# Patient Record
Sex: FEMALE | Race: BLACK OR AFRICAN AMERICAN | NOT HISPANIC OR LATINO | Employment: UNEMPLOYED | URBAN - METROPOLITAN AREA
[De-identification: names, ages, dates, MRNs, and addresses within clinical notes are randomized per-mention and may not be internally consistent; named-entity substitution may affect disease eponyms.]

---

## 2017-07-24 ENCOUNTER — HOSPITAL ENCOUNTER (EMERGENCY)
Facility: HOSPITAL | Age: 57
Discharge: HOME | End: 2017-07-24
Admitting: PHYSICIAN ASSISTANT

## 2017-07-24 VITALS
SYSTOLIC BLOOD PRESSURE: 140 MMHG | RESPIRATION RATE: 16 BRPM | HEIGHT: 66 IN | OXYGEN SATURATION: 98 % | HEART RATE: 60 BPM | DIASTOLIC BLOOD PRESSURE: 82 MMHG | WEIGHT: 225 LBS | TEMPERATURE: 98.3 F | BODY MASS INDEX: 36.16 KG/M2

## 2017-07-24 DIAGNOSIS — E03.9 HYPOTHYROIDISM, UNSPECIFIED TYPE: ICD-10-CM

## 2017-07-24 DIAGNOSIS — J32.9 SINUSITIS, UNSPECIFIED CHRONICITY, UNSPECIFIED LOCATION: Primary | ICD-10-CM

## 2017-07-24 DIAGNOSIS — R51.9 SINUS HEADACHE: ICD-10-CM

## 2017-07-24 PROCEDURE — 99282 EMERGENCY DEPT VISIT SF MDM: CPT | Performed by: PHYSICIAN ASSISTANT

## 2017-07-24 PROCEDURE — 99283 EMERGENCY DEPT VISIT LOW MDM: CPT

## 2017-07-24 PROCEDURE — 63710000001 PREDNISONE PER 1 MG: Performed by: PHYSICIAN ASSISTANT

## 2017-07-24 RX ORDER — METHYLPREDNISOLONE 4 MG/1
TABLET ORAL
Qty: 21 TABLET | Refills: 0 | Status: SHIPPED | OUTPATIENT
Start: 2017-07-24 | End: 2017-09-14

## 2017-07-24 RX ORDER — PREDNISONE 20 MG/1
40 TABLET ORAL ONCE
Status: COMPLETED | OUTPATIENT
Start: 2017-07-24 | End: 2017-07-24

## 2017-07-24 RX ORDER — LEVOTHYROXINE SODIUM 0.1 MG/1
100 TABLET ORAL DAILY
Qty: 30 TABLET | Refills: 0 | Status: SHIPPED | OUTPATIENT
Start: 2017-07-24 | End: 2017-08-23

## 2017-07-24 RX ADMIN — PREDNISONE 40 MG: 20 TABLET ORAL at 20:14

## 2017-07-24 NOTE — ED PROVIDER NOTES
Subjective   History of Present Illness  History of Present Illness    Chief complaint: Sinus pressure and refill of Synthroid    Location: Bilateral frontal sinuses    Quality/Severity:  Pressure, moderate    Timing/Duration: 8 weeks, chronic, varies in intensity    Modifying Factors: Nothing makes worse or better.    Associated Symptoms: Headache. Denies fevers or chills.  Denies neck pain.  Denies chest pain or shortness of breath.  Denies cough.    Narrative: 56-year-old female presents for sinus pressure that has been ongoing.  She states that she needs antibiotics.  She has not had any fevers.  She has a history of allergies and allergic rhinitis but is allergic to antihistamines.  She is unable to tolerate nasal corticosteroids but is able to tolerate oral steroids.  She also no longer has a primary care provider and needs a refill of her Synthroid.  She states that she needs the brand only.  Her last TSH was approximately 4 months ago.  She has been on Synthroid for many years and has not had any dosage changes.  Patient states that she did have a CT of her brain in the past year which was negative.  Patient also states that she does not have insurance and would like samples of Synthroid.    Review of Systems  General: Denies fevers or chills.  Denies any weakness or fatigue.  Denies any weight loss or weight gain.  SKIN: Denies any rashes lesions or ulcers.  Denies color change.  ENT: Denies sore throat. Denies ear pain.    EYES: Denies any blurred vision.  Denies any change in vision.  Denies any photophobia.  Denies any vision loss.  LUNGS: Denies any shortness of breath or wheezing.  Denies any cough.  Denies any hemoptysis.  CARDIAC: Denies any chest pain.  Denies palpitations.  Denies syncope.  Denies any edema  ABD: Denies any abdominal pain.  Denies any nausea or vomiting or diarrhea.  Denies any rectal bleeding.  Denies constipation  : Denies any dysuria, urgency, frequency or hematuria.  Denies  discharge.  Denies flank pain.  NEURO: Denies any focal weakness.  + headache.  Denies seizures.  Denies changes in speech or difficulty walking.  ENDOCRINE: Denies polydipsia and polyuria  M/S: Denies arthralgias, back pain, myalgias or neck pain  HEME/LYMPH: Negative for adenopathy. Does not bruise/bleed easily.   PSYCH: Negative for suicidal ideas. Denies anxiety or depression  A 10 system review was performed in addition to those in the above all other reviews are negative.      Past Medical History:   Diagnosis Date   • Colon polyp    • Disease of thyroid gland    • Hyperlipidemia        Allergies   Allergen Reactions   • Antihistamines, Loratadine-Type Hives   • Claritin [Loratadine] Hives       Past Surgical History:   Procedure Laterality Date   • ABDOMINAL SURGERY     • APPENDECTOMY     • COLON SURGERY     • COLONOSCOPY N/A 11/28/2016    Procedure: COLONOSCOPY with polypectomy;  Surgeon: Kristy Caballero MD;  Location: [facility];  Service:    • DENTAL PROCEDURE         Family History   Problem Relation Age of Onset   • Stroke Mother    • Prostate cancer Father    • Colon polyps Sister    • Hypertension Brother    • Prostate cancer Brother        Social History     Social History   • Marital status:      Spouse name: N/A   • Number of children: N/A   • Years of education: N/A     Social History Main Topics   • Smoking status: Never Smoker   • Smokeless tobacco: None   • Alcohol use Yes      Comment: rarely   • Drug use: No   • Sexual activity: Defer     Other Topics Concern   • None     Social History Narrative   • None     No current facility-administered medications on file prior to encounter.      Current Outpatient Prescriptions on File Prior to Encounter   Medication Sig Dispense Refill   • [DISCONTINUED] levothyroxine (SYNTHROID, LEVOTHROID) 100 MCG tablet Take 100 mcg by mouth daily.               Objective   Physical Exam  Vitals:    07/24/17 1855   BP: 158/97   Pulse: 68   Resp: 16   Temp: 98.3  °F (36.8 °C)   SpO2: 97%     GENERAL: a/o x 4, NAD  SKIN: Warm pink and dry   HEENT:  PERRLA, EOM intact, conjunctiva normal, sclera clear, TMs clear bilaterally.  Oropharynx clear.  There is no erythema or exudate.  Bilateral frontal sinus tenderness to palpation and percussion.   NECK: supple, no JVD.  Negative Kernig's and Brudzinski's  LUNGS: Clear to auscultation bilaterally without wheezes, rales or rhonchi.  No accessory muscle use and no nasal flaring.  CARDIAC:  Regular rate and rhythm, S1-S2.  No murmurs, rubs or gallops.  No peripheral edema.  Equal pulses bilaterally.  ABDOMEN: Soft, nontender, nondistended.  No guarding or rebound tenderness.  Normal bowel sounds.  MUSCULOSKELETAL: Moves all extremities well.  No deformity.  NEURO: Cranial nerves II through XII grossly intact.  No gross focal deficits.  Alert.  Normal speech and motor.  PSYCH: Normal mood and affect      Procedures         ED Course  ED Course    Patient states that she can take brand only Synthroid. (would like samples, but I explained to her that we do not have samples in the emergency Department)  We discussed a CT of the brain for further evaluation however patient refuses secondary to not having insurance and states that she did have one within the past year.  Pt has been counseled on elevated BP and instructed to f/u with PMD for repeat BP check.  Prednisone 40mg po x 1 in ER. D/c with medrol dose pack and saline nasal spray.               MDM  Number of Diagnoses or Management Options  Hypothyroidism, unspecified type: established and worsening  Sinus headache: established and worsening  Sinusitis, unspecified chronicity, unspecified location: established and worsening  Risk of Complications, Morbidity, and/or Mortality  Presenting problems: low  Diagnostic procedures: low  Management options: low    Patient Progress  Patient progress: improved    Patient is afebrile, symptoms have been going on for 8 weeks.  I doubt bacterial  infection at this time.  We'll treat for sinusitis with oral steroids as patient cannot tolerate intranasal medications.  I suggested that she use saline nasal spray as well.  My differential diagnosis for headache includes but is not limited to:  Migraine, cluster, ischemic stroke, subarachnoid hemorrhage, intracranial hemorrhage, vascular malformation, cerebral aneurysm, vascular dissection, vasculitis, temporal arteritis, malignant hypertension, pheochromocytoma, cerebral venous thrombosis, preeclampsia; bacterial meningitis, viral meningitis, fungal meningitis, encephalitis, brain abscess, pleural empyema, sinusitis, dental infection, influenza, viral syndrome; carbon monoxide exposure, analgesic abuse, hypoglycemia; trigeminal neuralgia, postherpetic neuralgia, occipital neuralgia; subdural hematoma, concussion, musculoskeletal tension, cervical osteoarthritis; glaucoma, TMJ disease, pseudotumor cerebri, post LP headache, intracranial neoplasm, sleep apnea    Final diagnoses:   Sinusitis, unspecified chronicity, unspecified location   Sinus headache   Hypothyroidism, unspecified type            Olga Thomas PA-C  07/24/17 2015

## 2017-09-14 ENCOUNTER — HOSPITAL ENCOUNTER (EMERGENCY)
Facility: HOSPITAL | Age: 57
Discharge: HOME | End: 2017-09-14
Admitting: PHYSICIAN ASSISTANT

## 2017-09-14 VITALS
DIASTOLIC BLOOD PRESSURE: 86 MMHG | OXYGEN SATURATION: 98 % | TEMPERATURE: 98.1 F | BODY MASS INDEX: 36.88 KG/M2 | HEART RATE: 68 BPM | HEIGHT: 67 IN | RESPIRATION RATE: 18 BRPM | SYSTOLIC BLOOD PRESSURE: 130 MMHG | WEIGHT: 235 LBS

## 2017-09-14 DIAGNOSIS — J32.9 SINUSITIS, UNSPECIFIED CHRONICITY, UNSPECIFIED LOCATION: ICD-10-CM

## 2017-09-14 DIAGNOSIS — Z76.0 MEDICATION REFILL: ICD-10-CM

## 2017-09-14 DIAGNOSIS — E03.9 HYPOTHYROIDISM, UNSPECIFIED TYPE: ICD-10-CM

## 2017-09-14 DIAGNOSIS — R51.9 SINUS HEADACHE: Primary | ICD-10-CM

## 2017-09-14 PROCEDURE — 99282 EMERGENCY DEPT VISIT SF MDM: CPT | Performed by: PHYSICIAN ASSISTANT

## 2017-09-14 PROCEDURE — 63710000001 PREDNISONE PER 1 MG: Performed by: PHYSICIAN ASSISTANT

## 2017-09-14 PROCEDURE — 99283 EMERGENCY DEPT VISIT LOW MDM: CPT

## 2017-09-14 RX ORDER — METHYLPREDNISOLONE 4 MG/1
TABLET ORAL
Qty: 21 TABLET | Refills: 0 | Status: SHIPPED | OUTPATIENT
Start: 2017-09-14 | End: 2018-10-11

## 2017-09-14 RX ORDER — LEVOTHYROXINE SODIUM 0.1 MG/1
100 TABLET ORAL DAILY
COMMUNITY
End: 2017-09-14

## 2017-09-14 RX ORDER — PREDNISONE 20 MG/1
60 TABLET ORAL ONCE
Status: COMPLETED | OUTPATIENT
Start: 2017-09-14 | End: 2017-09-14

## 2017-09-14 RX ORDER — ACETAMINOPHEN 325 MG/1
650 TABLET ORAL ONCE
Status: COMPLETED | OUTPATIENT
Start: 2017-09-14 | End: 2017-09-14

## 2017-09-14 RX ORDER — LEVOTHYROXINE SODIUM 0.1 MG/1
100 TABLET ORAL DAILY
Qty: 20 TABLET | Refills: 0 | Status: SHIPPED | OUTPATIENT
Start: 2017-09-14 | End: 2020-09-23 | Stop reason: ALTCHOICE

## 2017-09-14 RX ORDER — AZITHROMYCIN 250 MG/1
TABLET, FILM COATED ORAL
Qty: 6 TABLET | Refills: 0 | Status: SHIPPED | OUTPATIENT
Start: 2017-09-14 | End: 2018-10-11

## 2017-09-14 RX ADMIN — ACETAMINOPHEN 650 MG: 325 TABLET, FILM COATED ORAL at 19:16

## 2017-09-14 RX ADMIN — PREDNISONE 60 MG: 20 TABLET ORAL at 19:16

## 2017-09-14 NOTE — DISCHARGE INSTRUCTIONS
Return to the emergency department with worsening symptoms, uncontrolled pain, inability to tolerate oral liquids, fever greater than 101°F not controlled by Tylenol or as needed with emergent concerns.    Use saline nasal sprays and Mucinex to help with congestion.  Nikky Pots will also help with sinus congestion

## 2017-09-14 NOTE — ED PROVIDER NOTES
Subjective   History of Present Illness  History of Present Illness    Chief complaint: Sinusitis and medication refill    Location: Bilateral sinuses    Quality/Severity:  Congestion, mild    Timing/Duration: One month    Modifying Factors: Nothing makes worse or better.    Associated Symptoms: Positive sinus headache (no change in character from prior).  Patient has not taken anything today for.  Denies fevers or chills.  Denies chest pain or shortness of breath.  Denies cough.    Narrative: 56-year-old female presents with sinusitis for one month.  She has had yellow drainage.  She does have a sinus headache consistent with prior sinus infections.  She has not taken anything for the headache today.  She also would like a refill of her Synthroid.  She's been out of it for 3 weeks.  She does not have a primary care provider.  She has not been using saline nasal sprays as previously recommended.  She has not arranged for her primary care provider as of yet.      Review of Systems  General: Denies fevers or chills.  Denies any weakness or fatigue.  Denies any weight loss or weight gain.  SKIN: Denies any rashes lesions or ulcers.  Denies color change.    HEENT:  Sinus congestion. Denies any change in vision.  LUNGS: Denies any shortness of breath or wheezing.    CARDIAC: Denies any chest pain.  Denies palpitations.  Denies syncope.  Denies any edema  ABD: Denies any abdominal pain.  Denies any nausea or vomiting or diarrhea.    : Denies any dysuria, urgency, frequency or hematuria.    NEURO: Denies any weakness.  + headache.  Denies seizures.  Denies changes in speech or difficulty walking.  M/S: Denies arthralgias, back pain, myalgias or neck pain  PSYCH: Negative for suicidal ideas. Denies anxiety or depression   review was performed in addition to those in the above all other reviews are negative.    Past Medical History:   Diagnosis Date   • Colon polyp    • Disease of thyroid gland    • Hyperlipidemia         Allergies   Allergen Reactions   • Antihistamines, Loratadine-Type Hives   • Claritin [Loratadine] Hives       Past Surgical History:   Procedure Laterality Date   • ABDOMINAL SURGERY     • APPENDECTOMY     • COLON SURGERY     • COLONOSCOPY N/A 11/28/2016    Procedure: COLONOSCOPY with polypectomy;  Surgeon: Kristy Caballero MD;  Location: [facility] OR;  Service:    • DENTAL PROCEDURE         Family History   Problem Relation Age of Onset   • Stroke Mother    • Prostate cancer Father    • Colon polyps Sister    • Hypertension Brother    • Prostate cancer Brother        Social History     Social History   • Marital status:      Spouse name: N/A   • Number of children: N/A   • Years of education: N/A     Social History Main Topics   • Smoking status: Never Smoker   • Smokeless tobacco: None   • Alcohol use Yes      Comment: rarely   • Drug use: No   • Sexual activity: Defer     Other Topics Concern   • None     Social History Narrative     No current facility-administered medications on file prior to encounter.      Current Outpatient Prescriptions on File Prior to Encounter   Medication Sig Dispense Refill   • [DISCONTINUED] MethylPREDNISolone (MEDROL, CARLA,) 4 MG tablet Take as directed on package instructions. 21 tablet 0           Objective   Physical Exam  Vitals:    09/14/17 1849   BP: 130/86   Pulse: 68   Resp: 18   Temp: 98.1 °F (36.7 °C)   SpO2: 98%     GENERAL: Alert and oriented ×4.  No apparent distress.  SKIN: Warm, pink and dry  HEENT: Atraumatic normocephalic, TMs clear.  Boggy nasal mucosa.  Oropharynx clear.  No pharyngeal erythema, edema or exudate.  LUNGS: Clear to auscultation bilaterally without wheezes, rales or rhonchi  CARDIAC: Regular rate and rhythm.  S1 and S2.  No murmurs, rubs or gallops.  ABD: Soft, nontender  M/S: MAEW, no deformity  PSYCH: Normal mood and affect    Procedures         ED Course  ED Course      Prednisone and Tylenol given.    Instructed patient on importance of  primary care provider and close follow-up.  She should have a repeat blood pressure check with primary care provider.  She has been given the information for the [place] [facility].  We've also given her information for [facility] for her sinus issues.    Will d/c with zpak given symptoms >10 days, synthroid and medrol dose ilene          MDM  Number of Diagnoses or Management Options  Hypothyroidism, unspecified type: established and worsening  Medication refill: established and worsening  Sinus headache: new and does not require workup  Sinusitis, unspecified chronicity, unspecified location: new and does not require workup     Amount and/or Complexity of Data Reviewed  Tests in the medicine section of CPT®: reviewed and ordered    Risk of Complications, Morbidity, and/or Mortality  Presenting problems: low  Diagnostic procedures: low  Management options: moderate    Patient Progress  Patient progress: improved    My differential diagnosis for headache includes but is not limited to:  Migraine, cluster, ischemic stroke, subarachnoid hemorrhage, intracranial hemorrhage, vascular malformation, cerebral aneurysm, vascular dissection, vasculitis, temporal arteritis, malignant hypertension, pheochromocytoma, cerebral venous thrombosis, preeclampsia; bacterial meningitis, viral meningitis, fungal meningitis, encephalitis, brain abscess, pleural empyema, sinusitis, dental infection, influenza, viral syndrome; carbon monoxide exposure, analgesic abuse, hypoglycemia; trigeminal neuralgia, postherpetic neuralgia, occipital neuralgia; subdural hematoma, concussion, musculoskeletal tension, cervical osteoarthritis; glaucoma, TMJ disease, pseudotumor cerebri, post LP headache, intracranial neoplasm, sleep apnea    Final diagnoses:   Sinus headache   Sinusitis, unspecified chronicity, unspecified location   Hypothyroidism, unspecified type   Medication refill     EMR Dragon/Transcription disclaimer:      Much of this encounter note is  an electronic transcription/translation of spoken language to printed text. The electronic translation of spoken language may permit erroneous, or at times, nonsensical words or phrases to be inadvertently transcribed; Although I have reviewed the note for such errors, some may still exist.            Olga Thomas PA-C  09/14/17 1917

## 2018-10-08 ENCOUNTER — OFFICE VISIT (OUTPATIENT)
Dept: SURGERY | Facility: CLINIC | Age: 58
End: 2018-10-08

## 2018-10-08 VITALS
SYSTOLIC BLOOD PRESSURE: 120 MMHG | HEIGHT: 67 IN | WEIGHT: 238.8 LBS | DIASTOLIC BLOOD PRESSURE: 80 MMHG | HEART RATE: 76 BPM | OXYGEN SATURATION: 96 % | BODY MASS INDEX: 37.48 KG/M2

## 2018-10-08 DIAGNOSIS — D49.2 SOFT TISSUE TUMOR: Primary | ICD-10-CM

## 2018-10-08 PROCEDURE — 99203 OFFICE O/P NEW LOW 30 MIN: CPT | Performed by: SURGERY

## 2018-10-08 NOTE — PROGRESS NOTES
PATIENT INFORMATION  Opal Bowen       - 1960    CHIEF COMPLAINT  Chief Complaint   Patient presents with   • Mass     Right Flank x 2-3 years but increased in size       HISTORY OF PRESENT ILLNESS  HPI she complains of a right chest wall mass that has been increasing the size of the last 3 years.  She denies any trauma to the site she denies any drainage from the site.  She was told it was a lipoma in the past.  She does not have any additional masses.  There is no family history of masses.  She has not had it imaged        REVIEW OF SYSTEMS  Review of Systems otherwise negative      ACTIVE PROBLEMS  There are no active problems to display for this patient.        PAST MEDICAL HISTORY  Past Medical History:   Diagnosis Date   • Colon polyp    • Disease of thyroid gland    • Hyperlipidemia          SURGICAL HISTORY  Past Surgical History:   Procedure Laterality Date   • ABDOMINAL SURGERY     • APPENDECTOMY     • COLON SURGERY     • COLONOSCOPY N/A 2016    Procedure: COLONOSCOPY with polypectomy;  Surgeon: Kristy Caballero MD;  Location: [facility];  Service:    • DENTAL PROCEDURE           FAMILY HISTORY  Family History   Problem Relation Age of Onset   • Stroke Mother    • Prostate cancer Father    • Colon polyps Sister    • Hypertension Brother    • Prostate cancer Brother          SOCIAL HISTORY  Social History     Occupational History   • Not on file.     Social History Main Topics   • Smoking status: Never Smoker   • Smokeless tobacco: Not on file   • Alcohol use Yes      Comment: rarely   • Drug use: No   • Sexual activity: Defer       Debilities/Disabilities Identified: None    Emotional Behavior: Appropriate    CURRENT MEDICATIONS    Current Outpatient Prescriptions:   •  Ergocalciferol (VITAMIN D2 PO), Take  by mouth., Disp: , Rfl:   •  levothyroxine (SYNTHROID, LEVOTHROID) 100 MCG tablet, Take 1 tablet by mouth Daily., Disp: 20 tablet, Rfl: 0  •  azithromycin (ZITHROMAX Z-CARLA) 250 MG  "tablet, Take 2 tablets the first day, then 1 tablet daily for 4 days., Disp: 6 tablet, Rfl: 0  •  MethylPREDNISolone (MEDROL, CARLA,) 4 MG tablet, Take as directed on package instructions., Disp: 21 tablet, Rfl: 0    ALLERGIES  Antihistamines, loratadine-type and Claritin [loratadine]    VITALS  Vitals:    10/08/18 1450   BP: 120/80   Pulse: 76   SpO2: 96%   Weight: 108 kg (238 lb 12.8 oz)   Height: 170.2 cm (67\")       LAST RESULTS   Admission on 11/28/2016, Discharged on 11/28/2016   Component Date Value Ref Range Status   • Case Report 11/28/2016    Final                    Value:Surgical Pathology Report                         Case: [id number]                                  Authorizing Provider:  Kristy Caballero MD          Collected:           11/28/2016 11:32 AM          Ordering Location:     [facility]   Received:            11/28/2016 03:13 PM                                 OR                                                                           Pathologist:           Maria Fernanda Granados MD                                                           Specimens:   1) - Large Intestine, Left / Descending Colon                                                       2) - Large Intestine, Sigmoid Colon                                                       • Final Diagnosis 11/28/2016    Final                    Value:This result contains rich text formatting which cannot be displayed here.     No results found.    PHYSICAL EXAM  Physical Exam this alert black female in no active distress.  She is overweight.  She is oriented ×3.  Her heart shows regular rate and rhythm her lungs are clear and equal.  She has a 13 cm soft right chest wall mass.    ASSESSMENT  Soft tissue tumor      PLAN  The risks benefits and options were discussed with her in detail.  We will proceed with excision of this at [facility] on an outpatient basis on October 17.                            "

## 2018-10-08 NOTE — H&P
PATIENT INFORMATION  Opal Bowen       - 1960    CHIEF COMPLAINT  Chief Complaint   Patient presents with   • Mass     Right Flank x 2-3 years but increased in size       HISTORY OF PRESENT ILLNESS  HPI she complains of a right chest wall mass that has been increasing the size of the last 3 years.  She denies any trauma to the site she denies any drainage from the site.  She was told it was a lipoma in the past.  She does not have any additional masses.  There is no family history of masses.  She has not had it imaged        REVIEW OF SYSTEMS  Review of Systems otherwise negative      ACTIVE PROBLEMS  There are no active problems to display for this patient.        PAST MEDICAL HISTORY  Past Medical History:   Diagnosis Date   • Colon polyp    • Disease of thyroid gland    • Hyperlipidemia          SURGICAL HISTORY  Past Surgical History:   Procedure Laterality Date   • ABDOMINAL SURGERY     • APPENDECTOMY     • COLON SURGERY     • COLONOSCOPY N/A 2016    Procedure: COLONOSCOPY with polypectomy;  Surgeon: Kristy Caballero MD;  Location: [facility];  Service:    • DENTAL PROCEDURE           FAMILY HISTORY  Family History   Problem Relation Age of Onset   • Stroke Mother    • Prostate cancer Father    • Colon polyps Sister    • Hypertension Brother    • Prostate cancer Brother          SOCIAL HISTORY  Social History     Occupational History   • Not on file.     Social History Main Topics   • Smoking status: Never Smoker   • Smokeless tobacco: Not on file   • Alcohol use Yes      Comment: rarely   • Drug use: No   • Sexual activity: Defer       Debilities/Disabilities Identified: None    Emotional Behavior: Appropriate    CURRENT MEDICATIONS    Current Outpatient Prescriptions:   •  Ergocalciferol (VITAMIN D2 PO), Take  by mouth., Disp: , Rfl:   •  levothyroxine (SYNTHROID, LEVOTHROID) 100 MCG tablet, Take 1 tablet by mouth Daily., Disp: 20 tablet, Rfl: 0  •  azithromycin (ZITHROMAX Z-CARLA) 250 MG  "tablet, Take 2 tablets the first day, then 1 tablet daily for 4 days., Disp: 6 tablet, Rfl: 0  •  MethylPREDNISolone (MEDROL, CARLA,) 4 MG tablet, Take as directed on package instructions., Disp: 21 tablet, Rfl: 0    ALLERGIES  Antihistamines, loratadine-type and Claritin [loratadine]    VITALS  Vitals:    10/08/18 1450   BP: 120/80   Pulse: 76   SpO2: 96%   Weight: 108 kg (238 lb 12.8 oz)   Height: 170.2 cm (67\")       LAST RESULTS   Admission on 11/28/2016, Discharged on 11/28/2016   Component Date Value Ref Range Status   • Case Report 11/28/2016    Final                    Value:Surgical Pathology Report                         Case: [id number]                                  Authorizing Provider:  Kristy Caballero MD          Collected:           11/28/2016 11:32 AM          Ordering Location[facility]     [facility]   Received:            11/28/2016 03:13 PM                                 OR                                                                           Pathologist:           Maria Fernanda Granados MD                                                           Specimens:   1) - Large Intestine, Left / Descending Colon                                                       2) - Large Intestine, Sigmoid Colon                                                       • Final Diagnosis 11/28/2016    Final                    Value:This result contains rich text formatting which cannot be displayed here.     No results found.    PHYSICAL EXAM  Physical Exam this alert black female in no active distress.  She is overweight.  She is oriented ×3.  Her heart shows regular rate and rhythm her lungs are clear and equal.  She has a 13 cm soft right chest wall mass.    ASSESSMENT  Soft tissue tumor      PLAN  The risks benefits and options were discussed with her in detail.  We will proceed with excision of this at [facility] on an outpatient basis on October 17.                            "

## 2018-10-11 ENCOUNTER — APPOINTMENT (OUTPATIENT)
Dept: PREADMISSION TESTING | Facility: HOSPITAL | Age: 58
End: 2018-10-11

## 2018-10-11 VITALS
HEART RATE: 64 BPM | WEIGHT: 235.2 LBS | HEIGHT: 67 IN | OXYGEN SATURATION: 98 % | BODY MASS INDEX: 36.91 KG/M2 | SYSTOLIC BLOOD PRESSURE: 130 MMHG | DIASTOLIC BLOOD PRESSURE: 80 MMHG | RESPIRATION RATE: 16 BRPM

## 2018-10-11 DIAGNOSIS — D49.2 SOFT TISSUE TUMOR: ICD-10-CM

## 2018-10-11 LAB
ANION GAP SERPL CALCULATED.3IONS-SCNC: 13.9 MMOL/L
BUN BLD-MCNC: 12 MG/DL (ref 6–20)
BUN/CREAT SERPL: 13 (ref 7–25)
CALCIUM SPEC-SCNC: 9.4 MG/DL (ref 8.6–10.5)
CHLORIDE SERPL-SCNC: 100 MMOL/L (ref 98–107)
CO2 SERPL-SCNC: 25.1 MMOL/L (ref 22–29)
CREAT BLD-MCNC: 0.92 MG/DL (ref 0.57–1)
DEPRECATED RDW RBC AUTO: 47.2 FL (ref 37–54)
ERYTHROCYTE [DISTWIDTH] IN BLOOD BY AUTOMATED COUNT: 15.6 % (ref 11.5–14.5)
GFR SERPL CREATININE-BSD FRML MDRD: 76 ML/MIN/1.73
GLUCOSE BLD-MCNC: 93 MG/DL (ref 65–99)
HCT VFR BLD AUTO: 43.9 % (ref 37–47)
HGB BLD-MCNC: 14 G/DL (ref 12–16)
MCH RBC QN AUTO: 26.8 PG (ref 27–31)
MCHC RBC AUTO-ENTMCNC: 31.9 G/DL (ref 31–37)
MCV RBC AUTO: 84.1 FL (ref 81–99)
PLATELET # BLD AUTO: 264 10*3/MM3 (ref 140–500)
PMV BLD AUTO: 10 FL (ref 7.4–10.4)
POTASSIUM BLD-SCNC: 4.1 MMOL/L (ref 3.5–5.2)
RBC # BLD AUTO: 5.22 10*6/MM3 (ref 4.2–5.4)
SODIUM BLD-SCNC: 139 MMOL/L (ref 136–145)
WBC NRBC COR # BLD: 4.6 10*3/MM3 (ref 4.8–10.8)

## 2018-10-11 PROCEDURE — 36415 COLL VENOUS BLD VENIPUNCTURE: CPT

## 2018-10-11 PROCEDURE — 85027 COMPLETE CBC AUTOMATED: CPT | Performed by: SURGERY

## 2018-10-11 PROCEDURE — 93005 ELECTROCARDIOGRAM TRACING: CPT

## 2018-10-11 PROCEDURE — 80048 BASIC METABOLIC PNL TOTAL CA: CPT | Performed by: SURGERY

## 2018-10-11 PROCEDURE — 93010 ELECTROCARDIOGRAM REPORT: CPT | Performed by: INTERNAL MEDICINE

## 2018-10-11 NOTE — DISCHARGE INSTRUCTIONS
PRE-ADMISSION TESTING INSTRUCTIONS FOR ADULTS    Take these medications the morning of surgery with a small sip of water: Levothyroxine      No aspirin, advil, aleve, ibuprofen, naproxen, diet pills, decongestants, or herbal/vitamins for a week prior to surgery.    General Instructions:    • Do not eat solid food after midnight the night before surgery.  No gum, mints, or hard candy after midnight the night before surgery.  • You may drink clear liquids the day of surgery up until 2 hours before your arrival time.  (Until 4:30 a m)  • Clear liquids are liquids you can see through. Nothing RED in color.    Plain water    Sports drinks  Sodas     Gelatin (Jell-O)  Fruit juices without pulp such as white grape juice and apple juice  Popsicles that contain no fruit or yogurt  Tea or coffee (no cream or milk added)    • It is beneficial for you to have a clear drink that contains carbohydrates just before you leave your house and before your fasting time begins.  We suggest a 20 ounce bottle of Gatorade or Powerade for non-diabetic patients or a 20 ounce bottle of G2 or Powerade Zero for diabetic patients.     • Patients who avoid smoking, chewing tobacco and alcohol for 4 weeks prior to surgery have a reduced risk of post-operative complications.  If at all possible, quit smoking as many days before surgery as you can.    • Do not smoke, use chewing tobacco or drink alcohol the day of surgery    • Bring your C-PAP/ BI-PAP machine if you use one.  • Wear clean comfortable clothes and socks.  • Do not wear contact lenses, lotion, deodorant, or make-up.  Bring a case for your glasses if applicable. You may brush your teeth the morning of surgery.  • You may wear dentures/partials, do not put adhesive/glue on them.  • Bring crutches or walker if applicable.  • Leave all other jewelry and valuables at home.      Preventing a Surgical Site Infection:    • Shower the night before and on the morning of surgery using the  chlorhexidine soap you were given.  Use a clean washcloth with the soap.  Place clean sheets on your bed after showering the night before surgery. Do not use the CHG soap on your hair, face, or private areas. Wash your body gently for five (5) minutes. Do not scrub your skin.  Dry with a clean towel and dress in clean clothing.    • Do not shave the surgical area for 10 days-2 weeks prior to surgery  because the razor can irritate skin and make it easier to develop an infection.  • Make sure you, your family, and all healthcare providers clean their hands with soap and water or an alcohol based hand  before caring for you or your wound.      Day of surgery:    Your surgeon’s office will advise you of your arrival time for the day of surgery.    Upon arrival, a Pre-op nurse and Anesthesia provider will review your health history, obtain vital signs, and answer questions you may have.  The only belongings needed at this time will be your home medications and if applicable your C-PAP/BI-PAP machine.  If you are staying overnight your family can leave the rest of your belongings in the car and bring them to your room later.  A Pre-op nurse will start an IV and you may receive medication in preparation for surgery, including something to help you relax.  Your family will be able to see you in the Pre-op area.  While you are in surgery your family should notify the waiting room  if they leave the waiting room area and provide a contact phone number.    IF you have any questions, you can call the Pre-Admission Department at [phone] or your surgeon's office.  Notify your surgeon if  you become sick, have a fever, productive cough, or cannot be here the day of surgery    Please be aware that surgery does come with discomfort.  We want to make every effort to control your discomfort so please discuss any uncontrolled symptoms with your nurse.   Your doctor will most likely have prescribed pain  medications.      If you are going home after surgery, you will receive individualized written care instructions before being discharged.  A responsible adult (over the age of 18) must drive you to and from the hospital on the day of your surgery and stay with you for 24 hours after anesthesia.    If you are staying overnight following surgery, you will be transported to your hospital room following the recovery period.  [facility] has all private rooms.    Deductibles and co-payments are collected on the day of service. Please be prepared to pay the required co-pay, deductible or deposit on the day of service as defined by your plan.

## 2018-10-16 ENCOUNTER — ANESTHESIA EVENT (OUTPATIENT)
Dept: PERIOP | Facility: HOSPITAL | Age: 58
End: 2018-10-16

## 2018-10-17 ENCOUNTER — ANESTHESIA (OUTPATIENT)
Dept: PERIOP | Facility: HOSPITAL | Age: 58
End: 2018-10-17

## 2018-10-17 ENCOUNTER — HOSPITAL ENCOUNTER (OUTPATIENT)
Facility: HOSPITAL | Age: 58
Setting detail: HOSPITAL OUTPATIENT SURGERY
Discharge: HOME | End: 2018-10-17
Attending: SURGERY | Admitting: SURGERY

## 2018-10-17 VITALS
HEART RATE: 59 BPM | DIASTOLIC BLOOD PRESSURE: 76 MMHG | RESPIRATION RATE: 16 BRPM | SYSTOLIC BLOOD PRESSURE: 127 MMHG | TEMPERATURE: 98.4 F | OXYGEN SATURATION: 95 % | BODY MASS INDEX: 36.68 KG/M2 | WEIGHT: 234.2 LBS

## 2018-10-17 DIAGNOSIS — D49.2 SOFT TISSUE TUMOR: ICD-10-CM

## 2018-10-17 PROCEDURE — 25010000002 DEXAMETHASONE PER 1 MG: Performed by: ANESTHESIOLOGY

## 2018-10-17 PROCEDURE — 25010000002 HYDRALAZINE PER 20 MG: Performed by: NURSE ANESTHETIST, CERTIFIED REGISTERED

## 2018-10-17 PROCEDURE — 25010000002 KETOROLAC TROMETHAMINE PER 15 MG: Performed by: ANESTHESIOLOGY

## 2018-10-17 PROCEDURE — 25010000002 HYDROMORPHONE PER 4 MG: Performed by: ANESTHESIOLOGY

## 2018-10-17 PROCEDURE — 25010000002 FENTANYL CITRATE (PF) 100 MCG/2ML SOLUTION: Performed by: ANESTHESIOLOGY

## 2018-10-17 PROCEDURE — 21552 EXC NECK LES SC 3 CM/>: CPT | Performed by: SURGERY

## 2018-10-17 PROCEDURE — 25010000002 ONDANSETRON PER 1 MG: Performed by: ANESTHESIOLOGY

## 2018-10-17 PROCEDURE — 88305 TISSUE EXAM BY PATHOLOGIST: CPT

## 2018-10-17 PROCEDURE — 25010000002 PROPOFOL 10 MG/ML EMULSION: Performed by: ANESTHESIOLOGY

## 2018-10-17 PROCEDURE — 25010000002 MIDAZOLAM PER 1 MG: Performed by: ANESTHESIOLOGY

## 2018-10-17 PROCEDURE — 25010000003 CEFAZOLIN SODIUM-DEXTROSE 2-3 GM-%(50ML) RECONSTITUTED SOLUTION: Performed by: SURGERY

## 2018-10-17 RX ORDER — UBIDECARENONE 50 MG
1 CAPSULE ORAL DAILY
COMMUNITY
End: 2019-07-07

## 2018-10-17 RX ORDER — OXYCODONE HYDROCHLORIDE AND ACETAMINOPHEN 5; 325 MG/1; MG/1
1 TABLET ORAL ONCE AS NEEDED
Status: DISCONTINUED | OUTPATIENT
Start: 2018-10-17 | End: 2018-10-17 | Stop reason: HOSPADM

## 2018-10-17 RX ORDER — HYDRALAZINE HYDROCHLORIDE 20 MG/ML
INJECTION INTRAMUSCULAR; INTRAVENOUS AS NEEDED
Status: DISCONTINUED | OUTPATIENT
Start: 2018-10-17 | End: 2018-10-17 | Stop reason: SURG

## 2018-10-17 RX ORDER — ONDANSETRON 2 MG/ML
4 INJECTION INTRAMUSCULAR; INTRAVENOUS ONCE AS NEEDED
Status: DISCONTINUED | OUTPATIENT
Start: 2018-10-17 | End: 2018-10-17 | Stop reason: HOSPADM

## 2018-10-17 RX ORDER — DEXAMETHASONE SODIUM PHOSPHATE 4 MG/ML
8 INJECTION, SOLUTION INTRA-ARTICULAR; INTRALESIONAL; INTRAMUSCULAR; INTRAVENOUS; SOFT TISSUE ONCE
Status: COMPLETED | OUTPATIENT
Start: 2018-10-17 | End: 2018-10-17

## 2018-10-17 RX ORDER — PROPOFOL 10 MG/ML
VIAL (ML) INTRAVENOUS AS NEEDED
Status: DISCONTINUED | OUTPATIENT
Start: 2018-10-17 | End: 2018-10-17 | Stop reason: SURG

## 2018-10-17 RX ORDER — SODIUM CHLORIDE 0.9 % (FLUSH) 0.9 %
1-10 SYRINGE (ML) INJECTION AS NEEDED
Status: DISCONTINUED | OUTPATIENT
Start: 2018-10-17 | End: 2018-10-17 | Stop reason: HOSPADM

## 2018-10-17 RX ORDER — LIDOCAINE HYDROCHLORIDE 10 MG/ML
0.5 INJECTION, SOLUTION EPIDURAL; INFILTRATION; INTRACAUDAL; PERINEURAL ONCE AS NEEDED
Status: COMPLETED | OUTPATIENT
Start: 2018-10-17 | End: 2018-10-17

## 2018-10-17 RX ORDER — CYCLOBENZAPRINE HCL 10 MG
10 TABLET ORAL NIGHTLY
COMMUNITY
End: 2019-07-07

## 2018-10-17 RX ORDER — HYDROMORPHONE HYDROCHLORIDE 1 MG/ML
0.5 INJECTION, SOLUTION INTRAMUSCULAR; INTRAVENOUS; SUBCUTANEOUS AS NEEDED
Status: DISCONTINUED | OUTPATIENT
Start: 2018-10-17 | End: 2018-10-17 | Stop reason: HOSPADM

## 2018-10-17 RX ORDER — CEFAZOLIN SODIUM 2 G/50ML
2 SOLUTION INTRAVENOUS ONCE
Status: COMPLETED | OUTPATIENT
Start: 2018-10-17 | End: 2018-10-17

## 2018-10-17 RX ORDER — ONDANSETRON 2 MG/ML
4 INJECTION INTRAMUSCULAR; INTRAVENOUS ONCE AS NEEDED
Status: COMPLETED | OUTPATIENT
Start: 2018-10-17 | End: 2018-10-17

## 2018-10-17 RX ORDER — MEPERIDINE HYDROCHLORIDE 25 MG/ML
12.5 INJECTION INTRAMUSCULAR; INTRAVENOUS; SUBCUTANEOUS
Status: DISCONTINUED | OUTPATIENT
Start: 2018-10-17 | End: 2018-10-17 | Stop reason: HOSPADM

## 2018-10-17 RX ORDER — PROMETHAZINE HYDROCHLORIDE 25 MG/ML
INJECTION, SOLUTION INTRAMUSCULAR; INTRAVENOUS AS NEEDED
Status: DISCONTINUED | OUTPATIENT
Start: 2018-10-17 | End: 2018-10-17

## 2018-10-17 RX ORDER — SODIUM CHLORIDE, SODIUM LACTATE, POTASSIUM CHLORIDE, CALCIUM CHLORIDE 600; 310; 30; 20 MG/100ML; MG/100ML; MG/100ML; MG/100ML
100 INJECTION, SOLUTION INTRAVENOUS CONTINUOUS
Status: DISCONTINUED | OUTPATIENT
Start: 2018-10-17 | End: 2018-10-17 | Stop reason: HOSPADM

## 2018-10-17 RX ORDER — SODIUM CHLORIDE 0.9 % (FLUSH) 0.9 %
3 SYRINGE (ML) INJECTION EVERY 12 HOURS SCHEDULED
Status: DISCONTINUED | OUTPATIENT
Start: 2018-10-17 | End: 2018-10-17 | Stop reason: HOSPADM

## 2018-10-17 RX ORDER — OXYCODONE HYDROCHLORIDE AND ACETAMINOPHEN 5; 325 MG/1; MG/1
1 TABLET ORAL EVERY 4 HOURS PRN
Qty: 30 TABLET | Refills: 0 | Status: SHIPPED | OUTPATIENT
Start: 2018-10-17 | End: 2018-10-23

## 2018-10-17 RX ORDER — MIDAZOLAM HYDROCHLORIDE 1 MG/ML
2 INJECTION INTRAMUSCULAR; INTRAVENOUS
Status: DISCONTINUED | OUTPATIENT
Start: 2018-10-17 | End: 2018-10-17 | Stop reason: HOSPADM

## 2018-10-17 RX ORDER — MAGNESIUM HYDROXIDE 1200 MG/15ML
LIQUID ORAL AS NEEDED
Status: DISCONTINUED | OUTPATIENT
Start: 2018-10-17 | End: 2018-10-17 | Stop reason: HOSPADM

## 2018-10-17 RX ORDER — MIDAZOLAM HYDROCHLORIDE 1 MG/ML
1 INJECTION INTRAMUSCULAR; INTRAVENOUS
Status: DISCONTINUED | OUTPATIENT
Start: 2018-10-17 | End: 2018-10-17 | Stop reason: HOSPADM

## 2018-10-17 RX ORDER — SODIUM CHLORIDE 9 MG/ML
40 INJECTION, SOLUTION INTRAVENOUS AS NEEDED
Status: DISCONTINUED | OUTPATIENT
Start: 2018-10-17 | End: 2018-10-17 | Stop reason: HOSPADM

## 2018-10-17 RX ORDER — SODIUM CHLORIDE, SODIUM LACTATE, POTASSIUM CHLORIDE, CALCIUM CHLORIDE 600; 310; 30; 20 MG/100ML; MG/100ML; MG/100ML; MG/100ML
9 INJECTION, SOLUTION INTRAVENOUS CONTINUOUS
Status: DISCONTINUED | OUTPATIENT
Start: 2018-10-17 | End: 2018-10-17 | Stop reason: HOSPADM

## 2018-10-17 RX ORDER — GLYCOPYRROLATE 0.2 MG/ML
INJECTION INTRAMUSCULAR; INTRAVENOUS AS NEEDED
Status: DISCONTINUED | OUTPATIENT
Start: 2018-10-17 | End: 2018-10-17 | Stop reason: SURG

## 2018-10-17 RX ORDER — FENTANYL CITRATE 50 UG/ML
INJECTION, SOLUTION INTRAMUSCULAR; INTRAVENOUS AS NEEDED
Status: DISCONTINUED | OUTPATIENT
Start: 2018-10-17 | End: 2018-10-17 | Stop reason: SURG

## 2018-10-17 RX ORDER — LIDOCAINE HYDROCHLORIDE 20 MG/ML
INJECTION, SOLUTION INFILTRATION; PERINEURAL AS NEEDED
Status: DISCONTINUED | OUTPATIENT
Start: 2018-10-17 | End: 2018-10-17 | Stop reason: SURG

## 2018-10-17 RX ORDER — KETOROLAC TROMETHAMINE 30 MG/ML
INJECTION, SOLUTION INTRAMUSCULAR; INTRAVENOUS AS NEEDED
Status: DISCONTINUED | OUTPATIENT
Start: 2018-10-17 | End: 2018-10-17 | Stop reason: SURG

## 2018-10-17 RX ADMIN — LIDOCAINE HYDROCHLORIDE 0.5 ML: 10 INJECTION, SOLUTION EPIDURAL; INFILTRATION; INTRACAUDAL; PERINEURAL at 07:01

## 2018-10-17 RX ADMIN — FENTANYL CITRATE 25 MCG: 50 INJECTION, SOLUTION INTRAMUSCULAR; INTRAVENOUS at 07:59

## 2018-10-17 RX ADMIN — PROPOFOL 140 MG: 10 INJECTION, EMULSION INTRAVENOUS at 07:59

## 2018-10-17 RX ADMIN — KETOROLAC TROMETHAMINE 30 MG: 30 INJECTION INTRAMUSCULAR; INTRAVENOUS at 08:30

## 2018-10-17 RX ADMIN — GLYCOPYRROLATE 0.1 MG: 0.2 INJECTION INTRAMUSCULAR; INTRAVENOUS at 07:58

## 2018-10-17 RX ADMIN — LIDOCAINE HYDROCHLORIDE 100 MG: 20 INJECTION, SOLUTION INFILTRATION; PERINEURAL at 07:59

## 2018-10-17 RX ADMIN — HYDROMORPHONE HYDROCHLORIDE 0.5 MG: 1 INJECTION, SOLUTION INTRAMUSCULAR; INTRAVENOUS; SUBCUTANEOUS at 09:20

## 2018-10-17 RX ADMIN — DEXAMETHASONE SODIUM PHOSPHATE 8 MG: 4 INJECTION, SOLUTION INTRAMUSCULAR; INTRAVENOUS at 07:32

## 2018-10-17 RX ADMIN — HYDRALAZINE HYDROCHLORIDE 2.5 MG: 20 INJECTION INTRAMUSCULAR; INTRAVENOUS at 09:33

## 2018-10-17 RX ADMIN — ONDANSETRON 4 MG: 2 INJECTION, SOLUTION INTRAMUSCULAR; INTRAVENOUS at 07:32

## 2018-10-17 RX ADMIN — MIDAZOLAM HYDROCHLORIDE 1 MG: 1 INJECTION, SOLUTION INTRAMUSCULAR; INTRAVENOUS at 07:41

## 2018-10-17 RX ADMIN — HYDROMORPHONE HYDROCHLORIDE 0.5 MG: 1 INJECTION, SOLUTION INTRAMUSCULAR; INTRAVENOUS; SUBCUTANEOUS at 10:12

## 2018-10-17 RX ADMIN — CEFAZOLIN SODIUM 2 G: 2 SOLUTION INTRAVENOUS at 07:56

## 2018-10-17 RX ADMIN — FAMOTIDINE 20 MG: 10 INJECTION, SOLUTION INTRAVENOUS at 07:32

## 2018-10-17 RX ADMIN — FENTANYL CITRATE 25 MCG: 50 INJECTION, SOLUTION INTRAMUSCULAR; INTRAVENOUS at 08:12

## 2018-10-17 RX ADMIN — SODIUM CHLORIDE, POTASSIUM CHLORIDE, SODIUM LACTATE AND CALCIUM CHLORIDE 9 ML/HR: 600; 310; 30; 20 INJECTION, SOLUTION INTRAVENOUS at 07:01

## 2018-10-17 NOTE — OP NOTE
Preoperative diagnosis right chest wall mass  Postoperative diagnosis is same  Procedure excision of 13.4 cm right chest wall mass  Complications none  Drains none  Specimen 13.4 cm lipomatous mass  Estimated blood loss 10 cc  Anesthesia Gen.  Surgeon Dr. Palencia  Assistant none  Findings 13.4 cm multilobulated lipomatous subcutaneous mass  Procedure after satisfactory induction general anesthesia the patient was laid in the left lateral decubitus position with an axillary roll beanbag inflated her right chest was prepped and draped sterile fashion.  The mass was marked on the skin.  Transverse skin incision was made overlying the mass carried out through skin and subcutaneous tissue hemostasis was assured for cautery.  The mass was very close to the skin was taken directly off the dermal layer.  It was multilobulated  all lobulations were excised.  Specimen was passed off hemostasis was assured.  Subcutaneous tissue was closed in interrupted simple fashion with use of 3-0 Vicryl skin was closed with 4-0 Monocryl running subcuticular stitch burying the knots.  Wound is clean and dry and sterilely dressed with fluffs.  The patient tolerated the procedure well was transferred to the recovery room in stable condition.  When She is awake alert stable tolerating by mouth and has voided she'll be discharged home to follow-up in my office next week call for problems.  Diet as tolerated.  She was given a prescription for Percocet 5/325 one by mouth every 4 hours when necessary pain 30 these were dispensed without refills.  No lifting more than 5 pounds.  She may shower in the a.m.  She should call for problems and call for appointment for next week.

## 2018-10-17 NOTE — ANESTHESIA POSTPROCEDURE EVALUATION
Patient: Opal Bowen    Procedure Summary     Date:  10/17/18 Room / Location:  [facility] OR 1 /  [facility] OR    Anesthesia Start:  0754 Anesthesia Stop:  0849    Procedure:  EXCISION OF RIGHT CHEST WALL MASS (Right Chest) Diagnosis:       Soft tissue tumor      (Soft tissue tumor [D49.2])    Surgeon:  Tim Palencia MD Provider:  Susi Royal MD    Anesthesia Type:  general ASA Status:  2          Anesthesia Type: general  Last vitals  BP   167/95 (10/17/18 0947)   Temp   98.2 °F (36.8 °C) (10/17/18 0645)   Pulse   57 (10/17/18 0947)   Resp   18 (10/17/18 0932)     SpO2   100 % (10/17/18 0947)     Post Anesthesia Care and Evaluation    Patient location during evaluation: PHASE II  Patient participation: complete - patient participated  Level of consciousness: awake and alert  Pain score: 3  Pain management: satisfactory to patient  Airway patency: patent  Anesthetic complications: No anesthetic complications  PONV Status: none  Cardiovascular status: acceptable (Hypertensive in PACU, treated with apresoline.  BP good at this time, will discharge home.)  Respiratory status: acceptable  Hydration status: acceptable

## 2018-10-17 NOTE — INTERVAL H&P NOTE
H&P updated. The patient was examined and the following changes are noted:  vs  /86 (BP Location: Left arm, Patient Position: Lying)   Pulse 69   Temp 98.2 °F (36.8 °C) (Oral)   Resp 16   Wt 106 kg (234 lb 3.2 oz)   SpO2 97%   BMI 36.68 kg/m²

## 2018-10-17 NOTE — ANESTHESIA PREPROCEDURE EVALUATION
Anesthesia Evaluation     Patient summary reviewed and Nursing notes reviewed   history of anesthetic complications: PONV  NPO Solid Status: > 8 hours  NPO Liquid Status: > 8 hours           Airway   Mallampati: I  TM distance: >3 FB  Neck ROM: full  No difficulty expected  Dental          Pulmonary - normal exam    breath sounds clear to auscultation  (-) not a smoker  Cardiovascular - normal exam  Exercise tolerance: good (4-7 METS)    ECG reviewed  Rhythm: regular  Rate: normal      ROS comment: Narrative     RR Interval= 968 ms  MS Interval= 192 ms  QRSD Interval= 90 ms  QT Interval= 427 ms  QTc Interval= 434 ms  Heart Rate= 62 ms  P Axis= 42 deg  QRS Axis= 5 deg  T Wave Axis= 40 deg  I: 40 Axis= 63 deg  T: 40 Axis= 21 deg  ST Axis= 65 deg  Sinus rhythm  No Change from Prior Tracing  Electronically Signed by:  Amaury Samuel ([facility]) 11-Oct-2018 20:25:28  Date and Time of Study: 2018-10-11 13:17:25        Neuro/Psych- negative ROS  GI/Hepatic/Renal/Endo    (+) obesity,   hypothyroidism,     Musculoskeletal     Abdominal  - normal exam  (+) obese,    Substance History - negative use     OB/GYN negative ob/gyn ROS         Other   (+) arthritis (knees)                   Anesthesia Plan    ASA 2     general     intravenous induction   Anesthetic plan, all risks, benefits, and alternatives have been provided, discussed and informed consent has been obtained with: patient and spouse/significant other.

## 2018-10-17 NOTE — H&P (VIEW-ONLY)
PATIENT INFORMATION  Opal Bowen       - 1960    CHIEF COMPLAINT  Chief Complaint   Patient presents with   • Mass     Right Flank x 2-3 years but increased in size       HISTORY OF PRESENT ILLNESS  HPI she complains of a right chest wall mass that has been increasing the size of the last 3 years.  She denies any trauma to the site she denies any drainage from the site.  She was told it was a lipoma in the past.  She does not have any additional masses.  There is no family history of masses.  She has not had it imaged        REVIEW OF SYSTEMS  Review of Systems otherwise negative      ACTIVE PROBLEMS  There are no active problems to display for this patient.        PAST MEDICAL HISTORY  Past Medical History:   Diagnosis Date   • Colon polyp    • Disease of thyroid gland    • Hyperlipidemia          SURGICAL HISTORY  Past Surgical History:   Procedure Laterality Date   • ABDOMINAL SURGERY     • APPENDECTOMY     • COLON SURGERY     • COLONOSCOPY N/A 2016    Procedure: COLONOSCOPY with polypectomy;  Surgeon: Kristy Caballero MD;  Location: [facility];  Service:    • DENTAL PROCEDURE           FAMILY HISTORY  Family History   Problem Relation Age of Onset   • Stroke Mother    • Prostate cancer Father    • Colon polyps Sister    • Hypertension Brother    • Prostate cancer Brother          SOCIAL HISTORY  Social History     Occupational History   • Not on file.     Social History Main Topics   • Smoking status: Never Smoker   • Smokeless tobacco: Not on file   • Alcohol use Yes      Comment: rarely   • Drug use: No   • Sexual activity: Defer       Debilities/Disabilities Identified: None    Emotional Behavior: Appropriate    CURRENT MEDICATIONS    Current Outpatient Prescriptions:   •  Ergocalciferol (VITAMIN D2 PO), Take  by mouth., Disp: , Rfl:   •  levothyroxine (SYNTHROID, LEVOTHROID) 100 MCG tablet, Take 1 tablet by mouth Daily., Disp: 20 tablet, Rfl: 0  •  azithromycin (ZITHROMAX Z-CARLA) 250 MG  "tablet, Take 2 tablets the first day, then 1 tablet daily for 4 days., Disp: 6 tablet, Rfl: 0  •  MethylPREDNISolone (MEDROL, CARLA,) 4 MG tablet, Take as directed on package instructions., Disp: 21 tablet, Rfl: 0    ALLERGIES  Antihistamines, loratadine-type and Claritin [loratadine]    VITALS  Vitals:    10/08/18 1450   BP: 120/80   Pulse: 76   SpO2: 96%   Weight: 108 kg (238 lb 12.8 oz)   Height: 170.2 cm (67\")       LAST RESULTS   Admission on 11/28/2016, Discharged on 11/28/2016   Component Date Value Ref Range Status   • Case Report 11/28/2016    Final                    Value:Surgical Pathology Report                         Case: [id number]                                  Authorizing Provider:  Kristy Caballero MD          Collected:           11/28/2016 11:32 AM          Ordering Location:     [facility]   Received:            11/28/2016 03:13 PM                                 OR                                                                           Pathologist:           Maria Fernanda Granados MD                                                           Specimens:   1) - Large Intestine, Left / Descending Colon                                                       2) - Large Intestine, Sigmoid Colon                                                       • Final Diagnosis 11/28/2016    Final                    Value:This result contains rich text formatting which cannot be displayed here.     No results found.    PHYSICAL EXAM  Physical Exam this alert black female in no active distress.  She is overweight.  She is oriented ×3.  Her heart shows regular rate and rhythm her lungs are clear and equal.  She has a 13 cm soft right chest wall mass.    ASSESSMENT  Soft tissue tumor      PLAN  The risks benefits and options were discussed with her in detail.  We will proceed with excision of this at [facility] on an outpatient basis on October 17.                            "

## 2018-10-17 NOTE — ANESTHESIA PROCEDURE NOTES
Airway  Urgency: elective    Date/Time: 10/17/2018 8:00 AM  Airway not difficult    General Information and Staff    Patient location during procedure: OR  Anesthesiologist: HECTOR VILLA    Indications and Patient Condition  Indications for airway management: airway protection    Preoxygenated: yes  MILS maintained throughout  Mask difficulty assessment: 1 - vent by mask    Final Airway Details  Final airway type: supraglottic airway      Successful airway: Size 4    Number of attempts at approach: 1    Additional Comments  Atraumatic LMA placement.

## 2018-10-18 LAB
CYTO UR: NORMAL
LAB AP CASE REPORT: NORMAL
LAB AP CLINICAL INFORMATION: NORMAL
LAB AP DIAGNOSIS COMMENT: NORMAL
PATH REPORT.FINAL DX SPEC: NORMAL
PATH REPORT.GROSS SPEC: NORMAL

## 2018-10-23 ENCOUNTER — OFFICE VISIT (OUTPATIENT)
Dept: SURGERY | Facility: CLINIC | Age: 58
End: 2018-10-23

## 2018-10-23 DIAGNOSIS — Z09 SURGICAL FOLLOW-UP CARE: Primary | ICD-10-CM

## 2018-10-23 PROCEDURE — 99024 POSTOP FOLLOW-UP VISIT: CPT | Performed by: SURGERY

## 2018-10-23 NOTE — PROGRESS NOTES
6 DAYS S/P EXC CHEST WALL MASS, NO COMPLAINTS  She is without complaints.  Her incision is healing well.  There is no cellulitis.  There is a small seroma.  Her pathology was discussed and was consistent with a lipoma.  I will see her back in 1 month

## 2018-11-26 ENCOUNTER — OFFICE VISIT (OUTPATIENT)
Dept: SURGERY | Facility: CLINIC | Age: 58
End: 2018-11-26

## 2018-11-26 DIAGNOSIS — Z09 SURGICAL FOLLOW-UP CARE: Primary | ICD-10-CM

## 2018-11-26 PROCEDURE — 99024 POSTOP FOLLOW-UP VISIT: CPT | Performed by: SURGERY

## 2018-11-26 NOTE — PROGRESS NOTES
5 wks 5 days s/p exc R chest wall mass, no complaints  She is without complaints.  The incision is well-healed.  The seroma has resolved.  There is no cellulitis or recurrent mass.  I will see her back when necessary.

## 2019-07-07 ENCOUNTER — APPOINTMENT (OUTPATIENT)
Dept: GENERAL RADIOLOGY | Facility: HOSPITAL | Age: 59
End: 2019-07-07

## 2019-07-07 ENCOUNTER — HOSPITAL ENCOUNTER (EMERGENCY)
Facility: HOSPITAL | Age: 59
Discharge: HOME | End: 2019-07-07
Attending: EMERGENCY MEDICINE | Admitting: EMERGENCY MEDICINE

## 2019-07-07 VITALS
HEART RATE: 71 BPM | OXYGEN SATURATION: 99 % | WEIGHT: 210 LBS | RESPIRATION RATE: 18 BRPM | HEIGHT: 67 IN | TEMPERATURE: 98.9 F | DIASTOLIC BLOOD PRESSURE: 82 MMHG | BODY MASS INDEX: 32.96 KG/M2 | SYSTOLIC BLOOD PRESSURE: 138 MMHG

## 2019-07-07 DIAGNOSIS — T50.905A ADVERSE EFFECT OF DRUG, INITIAL ENCOUNTER: ICD-10-CM

## 2019-07-07 DIAGNOSIS — J01.90 ACUTE SINUSITIS, RECURRENCE NOT SPECIFIED, UNSPECIFIED LOCATION: Primary | ICD-10-CM

## 2019-07-07 DIAGNOSIS — R05.9 COUGH: ICD-10-CM

## 2019-07-07 PROCEDURE — 99283 EMERGENCY DEPT VISIT LOW MDM: CPT

## 2019-07-07 PROCEDURE — 71046 X-RAY EXAM CHEST 2 VIEWS: CPT

## 2019-07-07 PROCEDURE — 99282 EMERGENCY DEPT VISIT SF MDM: CPT | Performed by: EMERGENCY MEDICINE

## 2019-07-07 PROCEDURE — 63710000001 PREDNISONE PER 1 MG: Performed by: EMERGENCY MEDICINE

## 2019-07-07 RX ORDER — PREDNISONE 20 MG/1
40 TABLET ORAL ONCE
Status: COMPLETED | OUTPATIENT
Start: 2019-07-07 | End: 2019-07-07

## 2019-07-07 RX ORDER — SOD CHLOR,BICARB/SQUEEZ BOTTLE
PACKET, WITH RINSE DEVICE NASAL
Qty: 1 EACH | Refills: 0 | OUTPATIENT
Start: 2019-07-07 | End: 2019-12-11

## 2019-07-07 RX ORDER — DOXYCYCLINE HYCLATE 100 MG/1
100 CAPSULE ORAL 2 TIMES DAILY
Qty: 20 CAPSULE | Refills: 0 | OUTPATIENT
Start: 2019-07-07 | End: 2019-12-11

## 2019-07-07 RX ORDER — LISINOPRIL AND HYDROCHLOROTHIAZIDE 20; 12.5 MG/1; MG/1
1 TABLET ORAL DAILY
COMMUNITY
End: 2021-08-27

## 2019-07-07 RX ORDER — PREDNISONE 10 MG/1
TABLET ORAL
Qty: 28 TABLET | Refills: 0 | OUTPATIENT
Start: 2019-07-07 | End: 2019-12-11

## 2019-07-07 RX ADMIN — PREDNISONE 40 MG: 20 TABLET ORAL at 23:17

## 2019-07-08 NOTE — ED PROVIDER NOTES
" EMERGENCY DEPARTMENT ENCOUNTER      Room Number: 8/08      HPI:    Chief complaint: Rash    Location: Neck    Quality/Severity: Mild to moderate    Timing/Duration: Began this evening    Modifying Factors: Started after use of oxymetazoline nasal spray    Associated Symptoms: URI symptoms with nonproductive cough for 1 month.  No fevers.  No hemoptysis.  No lower extremity swelling.  Normal appetite.  No black or bloody stools.  No nausea vomiting.    Narrative: Pt is a 58 y.o. female who presents complaining of acute onset \"hives\" after using oxymetazoline nasal spray this evening for URI symptoms.  Patient reports URI symptoms with sinus pressure and abnormally colored nasal discharge with cough worsening over 1 month.  No reported fever, hemoptysis, lower extremity edema or pain.      PMD: [facility]    REVIEW OF SYSTEMS  Review of Systems  URI symptoms with nonproductive cough for 1 month.  No fevers.  No hemoptysis.  No lower extremity swelling.  Normal appetite.  No black or bloody stools.  No nausea vomiting.      PAST MEDICAL HISTORY  Active Ambulatory Problems     Diagnosis Date Noted   • Soft tissue tumor 10/08/2018     Resolved Ambulatory Problems     Diagnosis Date Noted   • No Resolved Ambulatory Problems     Past Medical History:   Diagnosis Date   • Bowel obstruction (CMS/HCC) 2016   • Colon polyp    • Disease of thyroid gland    • Hyperlipidemia    • PONV (postoperative nausea and vomiting)        PAST SURGICAL HISTORY  Past Surgical History:   Procedure Laterality Date   • APPENDECTOMY     • CHEST WALL MASS EXCISION Right 10/17/2018    Procedure: EXCISION OF RIGHT CHEST WALL MASS;  Surgeon: Tim Palencia MD;  Location: [facility] OR;  Service: General   • COLON SURGERY  2016    states piece removed d/t obstruction/scar tissue   • COLONOSCOPY N/A 11/28/2016    Procedure: COLONOSCOPY with polypectomy;  Surgeon: Kristy Caballero MD;  Location: [facility] OR;  Service:    • OVARIAN CYST SURGERY      x2 "       FAMILY HISTORY  Family History   Problem Relation Age of Onset   • Stroke Mother    • Hypertension Mother    • Alzheimer's disease Mother    • Prostate cancer Father    • Heart disease Father         pacemaker   • Colon polyps Sister    • Hypertension Brother    • Prostate cancer Brother    • Malig Hyperthermia Neg Hx        SOCIAL HISTORY  Social History     Socioeconomic History   • Marital status:      Spouse name: Not on file   • Number of children: Not on file   • Years of education: Not on file   • Highest education level: Not on file   Tobacco Use   • Smoking status: Never Smoker   • Smokeless tobacco: Never Used   Substance and Sexual Activity   • Alcohol use: Yes     Comment: rarely   • Drug use: No   • Sexual activity: Defer       ALLERGIES  Claritin [loratadine] and Other    PHYSICAL EXAM  ED Triage Vitals [07/07/19 2237]   Temp Heart Rate Resp BP SpO2   98.9 °F (37.2 °C) 71 18 138/82 99 %      Temp src Heart Rate Source Patient Position BP Location FiO2 (%)   Oral Monitor Sitting Right arm --       Physical Exam   Constitutional: She is oriented to person, place, and time and well-developed, well-nourished, and in no distress. No distress.   HENT:   Head: Normocephalic.   Right Ear: External ear normal.   Left Ear: External ear normal.   Mouth/Throat: Oropharynx is clear and moist. No oropharyngeal exudate.   Mucous membranes moist; normal voice.  No intraoral swelling identified.  No difficulty managing her airway or secretions.   Eyes: EOM are normal. Pupils are equal, round, and reactive to light. No scleral icterus.   Neck: Neck supple.   Painless range of motion   Cardiovascular: Normal rate and regular rhythm.   Pulmonary/Chest: Effort normal and breath sounds normal. No stridor. No respiratory distress. She has no wheezes.   Musculoskeletal: She exhibits no edema or tenderness.   Moves all extremities equally   Neurological: She is alert and oriented to person, place, and time.    Skin: Skin is warm and dry.   Fairly dark skinned individual with perhaps mild hyperemia and a few excoriations on her neck.  No large urticaria noted.   Psychiatric: Mood and affect normal.   Nursing note and vitals reviewed.      LAB RESULTS        I ordered the above labs and reviewed the results    RADIOLOGY  Xr Chest 2 View    Result Date: 7/7/2019  Narrative: CR Chest 2 Vws INDICATION:  Productive cough, sinus congestion for one month COMPARISON:  None. FINDINGS: PA and lateral views of the chest.  Heart and mediastinal contours are normal. The lungs are clear. No pneumothorax or pleural effusion.      Impression: Negative chest radiograph. Signer Name: Ranjit Perez MD  Signed: 7/7/2019 11:13 PM  Workstation Name: [id number]       I ordered the above radiologic testing and reviewed the results    PROCEDURES  Procedures      PROGRESS AND CONSULTS  ED Course as of Jul 07 2327   Sun Jul 07, 2019   0523 Patient reports allergy to all antihistamines, Benadryl and famotidine will be avoided.  Prednisone p.o. and chest x-ray ordered.  Patient agreeable with plan.  [RS]      ED Course User Index  [RS] Sourav Ku MD           MEDICAL DECISION MAKING  Results were reviewed/discussed with the patient and they were also made aware of online access. Pt also made aware that some labs, such as cultures, will not be resulted during ER visit and follow up with PMD is necessary.     MDM       DIAGNOSIS  Final diagnoses:   Acute sinusitis, recurrence not specified, unspecified location   Cough   Adverse effect of drug, initial encounter       Latest Documented Vital Signs:  As of 11:27 PM  BP- 138/82 HR- 71 Temp- 98.9 °F (37.2 °C) (Oral) O2 sat- 99%    DISPOSITION  Discharge-stable       Medication List      New Prescriptions    doxycycline 100 MG capsule  Commonly known as:  VIBRAMYCIN  Take 1 capsule by mouth 2 (Two) Times a Day.     predniSONE 10 MG tablet  Commonly known as:  DELTASONE  Take 4 tablets by mouth daily for  5 days then 2 tablets by mouth daily for   3 days then 1 tablet by mouth daily for 2 days     SINUS RINSE KIT pack  Use as directed on package        Changed    levothyroxine 100 MCG tablet  Commonly known as:  SYNTHROID, LEVOTHROID  Take 1 tablet by mouth Daily.  What changed:  how much to take          Follow-up Information     [facility]. Schedule an appointment as soon as possible for a visit in 1 week.    Why:  As needed, If symptoms fail to improve  Contact information:  [address] [facility] [address] [phone]                      Sourav Ku MD  07/07/19 4013

## 2019-12-11 ENCOUNTER — HOSPITAL ENCOUNTER (EMERGENCY)
Facility: HOSPITAL | Age: 59
Discharge: HOME | End: 2019-12-11
Attending: EMERGENCY MEDICINE | Admitting: EMERGENCY MEDICINE

## 2019-12-11 ENCOUNTER — APPOINTMENT (OUTPATIENT)
Dept: CARDIOLOGY | Facility: HOSPITAL | Age: 59
End: 2019-12-11

## 2019-12-11 VITALS
DIASTOLIC BLOOD PRESSURE: 85 MMHG | BODY MASS INDEX: 36 KG/M2 | HEIGHT: 66 IN | OXYGEN SATURATION: 100 % | TEMPERATURE: 98.4 F | WEIGHT: 224 LBS | RESPIRATION RATE: 16 BRPM | HEART RATE: 55 BPM | SYSTOLIC BLOOD PRESSURE: 151 MMHG

## 2019-12-11 DIAGNOSIS — I44.1 SECOND DEGREE AV BLOCK, MOBITZ TYPE II: Primary | ICD-10-CM

## 2019-12-11 LAB
ALBUMIN SERPL-MCNC: 4.3 G/DL (ref 3.5–5.2)
ALBUMIN/GLOB SERPL: 1.1 G/DL
ALP SERPL-CCNC: 97 U/L (ref 39–117)
ALT SERPL W P-5'-P-CCNC: 22 U/L (ref 1–33)
ANION GAP SERPL CALCULATED.3IONS-SCNC: 13 MMOL/L (ref 5–15)
APTT PPP: 40.4 SECONDS (ref 24.3–38.1)
AST SERPL-CCNC: 26 U/L (ref 1–32)
BASOPHILS # BLD AUTO: 0.04 10*3/MM3 (ref 0–0.2)
BASOPHILS NFR BLD AUTO: 0.9 % (ref 0–1.5)
BILIRUB SERPL-MCNC: 0.3 MG/DL (ref 0.2–1.2)
BUN BLD-MCNC: 13 MG/DL (ref 8–23)
BUN/CREAT SERPL: 13.8 (ref 7–25)
CALCIUM SPEC-SCNC: 9.8 MG/DL (ref 8.6–10.5)
CHLORIDE SERPL-SCNC: 98 MMOL/L (ref 98–107)
CO2 SERPL-SCNC: 24 MMOL/L (ref 22–29)
CREAT BLD-MCNC: 0.94 MG/DL (ref 0.57–1)
DEPRECATED RDW RBC AUTO: 47.7 FL (ref 37–54)
EOSINOPHIL # BLD AUTO: 0.12 10*3/MM3 (ref 0–0.4)
EOSINOPHIL NFR BLD AUTO: 2.7 % (ref 0.3–6.2)
ERYTHROCYTE [DISTWIDTH] IN BLOOD BY AUTOMATED COUNT: 14.9 % (ref 12.3–15.4)
GFR SERPL CREATININE-BSD FRML MDRD: 73 ML/MIN/1.73
GLOBULIN UR ELPH-MCNC: 3.8 GM/DL
GLUCOSE BLD-MCNC: 94 MG/DL (ref 65–99)
HCT VFR BLD AUTO: 40.1 % (ref 34–46.6)
HGB BLD-MCNC: 12.6 G/DL (ref 12–15.9)
IMM GRANULOCYTES # BLD AUTO: 0 10*3/MM3 (ref 0–0.05)
IMM GRANULOCYTES NFR BLD AUTO: 0 % (ref 0–0.5)
INR PPP: 0.92 (ref 0.9–1.1)
LYMPHOCYTES # BLD AUTO: 1.57 10*3/MM3 (ref 0.7–3.1)
LYMPHOCYTES NFR BLD AUTO: 35.4 % (ref 19.6–45.3)
MCH RBC QN AUTO: 27.1 PG (ref 26.6–33)
MCHC RBC AUTO-ENTMCNC: 31.4 G/DL (ref 31.5–35.7)
MCV RBC AUTO: 86.2 FL (ref 79–97)
MONOCYTES # BLD AUTO: 0.44 10*3/MM3 (ref 0.1–0.9)
MONOCYTES NFR BLD AUTO: 9.9 % (ref 5–12)
NEUTROPHILS # BLD AUTO: 2.27 10*3/MM3 (ref 1.7–7)
NEUTROPHILS NFR BLD AUTO: 51.1 % (ref 42.7–76)
NT-PROBNP SERPL-MCNC: 77.8 PG/ML (ref 5–900)
PLATELET # BLD AUTO: 215 10*3/MM3 (ref 140–450)
PMV BLD AUTO: 9.6 FL (ref 6–12)
POTASSIUM BLD-SCNC: 4 MMOL/L (ref 3.5–5.2)
PROT SERPL-MCNC: 8.1 G/DL (ref 6–8.5)
PROTHROMBIN TIME: 12 SECONDS (ref 12.1–15)
RBC # BLD AUTO: 4.65 10*6/MM3 (ref 3.77–5.28)
SODIUM BLD-SCNC: 135 MMOL/L (ref 136–145)
TROPONIN T SERPL-MCNC: <0.01 NG/ML (ref 0–0.03)
WBC NRBC COR # BLD: 4.44 10*3/MM3 (ref 3.4–10.8)

## 2019-12-11 PROCEDURE — 85730 THROMBOPLASTIN TIME PARTIAL: CPT | Performed by: EMERGENCY MEDICINE

## 2019-12-11 PROCEDURE — 80053 COMPREHEN METABOLIC PANEL: CPT | Performed by: EMERGENCY MEDICINE

## 2019-12-11 PROCEDURE — 84484 ASSAY OF TROPONIN QUANT: CPT | Performed by: EMERGENCY MEDICINE

## 2019-12-11 PROCEDURE — 99284 EMERGENCY DEPT VISIT MOD MDM: CPT

## 2019-12-11 PROCEDURE — 93005 ELECTROCARDIOGRAM TRACING: CPT | Performed by: EMERGENCY MEDICINE

## 2019-12-11 PROCEDURE — 99284 EMERGENCY DEPT VISIT MOD MDM: CPT | Performed by: EMERGENCY MEDICINE

## 2019-12-11 PROCEDURE — 85610 PROTHROMBIN TIME: CPT | Performed by: EMERGENCY MEDICINE

## 2019-12-11 PROCEDURE — 83880 ASSAY OF NATRIURETIC PEPTIDE: CPT | Performed by: EMERGENCY MEDICINE

## 2019-12-11 PROCEDURE — 93225 XTRNL ECG REC<48 HRS REC: CPT

## 2019-12-11 PROCEDURE — 85025 COMPLETE CBC W/AUTO DIFF WBC: CPT | Performed by: EMERGENCY MEDICINE

## 2019-12-11 PROCEDURE — 93226 XTRNL ECG REC<48 HR SCAN A/R: CPT

## 2019-12-11 PROCEDURE — 93010 ELECTROCARDIOGRAM REPORT: CPT | Performed by: INTERNAL MEDICINE

## 2019-12-11 RX ORDER — SODIUM CHLORIDE 0.9 % (FLUSH) 0.9 %
10 SYRINGE (ML) INJECTION AS NEEDED
Status: DISCONTINUED | OUTPATIENT
Start: 2019-12-11 | End: 2019-12-11 | Stop reason: HOSPADM

## 2019-12-11 NOTE — ED PROVIDER NOTES
EMERGENCY DEPARTMENT ENCOUNTER      Room Number: 2/02      HPI:    Chief complaint: Slow heart rate and abnormal EKG    Location: Findings noted at the [facility]    Quality/Severity: Moderate    Timing/Duration: Findings discovered just prior to arrival    Modifying Factors: None    Associated Symptoms: None    Narrative: Pt is a 59 y.o. female who presents complaining of slow heart rate and abnormal EKG as noted above.  Patient presented to the [facility] for routine follow-up when bradycardia was noted on exam.  Patient was also having ectopy and therefore EKG obtained.  The EKG did demonstrate bradycardia and a type II second-degree block.  Patient subsequently referred here for further evaluation/treatment.  Patient does have a history of hypertension for which she takes lisinopril/hydrochlorothiazide, but there is no history of heart disease.      PMD: Provider, No Known    REVIEW OF SYSTEMS  Review of Systems   Constitutional: Negative for activity change, appetite change, fatigue and fever.   HENT: Negative for congestion.    Respiratory: Negative for cough, shortness of breath and wheezing.    Cardiovascular: Positive for leg swelling (Mild). Negative for chest pain and palpitations.   Gastrointestinal: Negative for abdominal pain, diarrhea, nausea and vomiting.   Endocrine: Negative for polydipsia.   Genitourinary: Negative for difficulty urinating, dysuria, flank pain, frequency and urgency.   Musculoskeletal: Negative for back pain.   Skin: Negative for rash.   Neurological: Negative for dizziness, syncope, weakness, light-headedness and headaches.   Psychiatric/Behavioral: Negative for confusion.   All other systems reviewed and are negative.      PAST MEDICAL HISTORY  Active Ambulatory Problems     Diagnosis Date Noted   • Soft tissue tumor 10/08/2018     Resolved Ambulatory Problems     Diagnosis Date Noted   • No Resolved Ambulatory Problems     Past Medical History:   Diagnosis Date   • Bowel  obstruction (CMS/HCC) 2016   • Colon polyp    • Disease of thyroid gland    • Hyperlipidemia    • PONV (postoperative nausea and vomiting)        PAST SURGICAL HISTORY  Past Surgical History:   Procedure Laterality Date   • APPENDECTOMY     • CHEST WALL MASS EXCISION Right 10/17/2018    Procedure: EXCISION OF RIGHT CHEST WALL MASS;  Surgeon: Tim Palencia MD;  Location: [facility] OR;  Service: General   • COLON SURGERY  2016    states piece removed d/t obstruction/scar tissue   • COLONOSCOPY N/A 11/28/2016    Procedure: COLONOSCOPY with polypectomy;  Surgeon: Kristy Caballero MD;  Location: [facility] OR;  Service:    • OVARIAN CYST SURGERY      x2       FAMILY HISTORY  Family History   Problem Relation Age of Onset   • Stroke Mother    • Hypertension Mother    • Alzheimer's disease Mother    • Prostate cancer Father    • Heart disease Father         pacemaker   • Colon polyps Sister    • Hypertension Brother    • Prostate cancer Brother    • Malig Hyperthermia Neg Hx        SOCIAL HISTORY  Social History     Socioeconomic History   • Marital status:      Spouse name: Not on file   • Number of children: Not on file   • Years of education: Not on file   • Highest education level: Not on file   Tobacco Use   • Smoking status: Never Smoker   • Smokeless tobacco: Never Used   Substance and Sexual Activity   • Alcohol use: Yes     Comment: rarely   • Drug use: No   • Sexual activity: Defer       ALLERGIES  Claritin [loratadine] and Other    PHYSICAL EXAM  ED Triage Vitals   Temp Heart Rate Resp BP SpO2   12/11/19 1248 12/11/19 1242 12/11/19 1242 12/11/19 1248 12/11/19 1248   98.4 °F (36.9 °C) (!) 47 16 143/81 100 %      Temp src Heart Rate Source Patient Position BP Location FiO2 (%)   -- -- -- -- --              Physical Exam   Constitutional: She is oriented to person, place, and time.   The patient is a pleasant, mildly obese, 59-year-old, black female in no acute distress.   HENT:   Head: Normocephalic and  atraumatic.   Eyes: Conjunctivae and EOM are normal.   Neck: Normal range of motion. Neck supple.   Cardiovascular:   No murmur heard.  Bradycardia with occasional ectopy.  Cardiac monitor demonstrates P wave without following QRS complex.  Heart rate varies between 45 and 54 bpm.  No murmurs.   Pulmonary/Chest: Effort normal and breath sounds normal. No respiratory distress.   Abdominal: Soft. Bowel sounds are normal. There is no tenderness.   Musculoskeletal: Normal range of motion. She exhibits edema (Trace bilateral edema).   Neurological: She is alert and oriented to person, place, and time.   Skin: Skin is warm and dry.   Psychiatric: Affect and judgment normal.   Nursing note and vitals reviewed.      LAB RESULTS  Results for orders placed or performed during the hospital encounter of 12/11/19   Comprehensive Metabolic Panel   Result Value Ref Range    Glucose 94 65 - 99 mg/dL    BUN 13 8 - 23 mg/dL    Creatinine 0.94 0.57 - 1.00 mg/dL    Sodium 135 (L) 136 - 145 mmol/L    Potassium 4.0 3.5 - 5.2 mmol/L    Chloride 98 98 - 107 mmol/L    CO2 24.0 22.0 - 29.0 mmol/L    Calcium 9.8 8.6 - 10.5 mg/dL    Total Protein 8.1 6.0 - 8.5 g/dL    Albumin 4.30 3.50 - 5.20 g/dL    ALT (SGPT) 22 1 - 33 U/L    AST (SGOT) 26 1 - 32 U/L    Alkaline Phosphatase 97 39 - 117 U/L    Total Bilirubin 0.3 0.2 - 1.2 mg/dL    eGFR  African Amer 73 >60 mL/min/1.73    Globulin 3.8 gm/dL    A/G Ratio 1.1 g/dL    BUN/Creatinine Ratio 13.8 7.0 - 25.0    Anion Gap 13.0 5.0 - 15.0 mmol/L   Protime-INR   Result Value Ref Range    Protime 12.0 (L) 12.1 - 15.0 Seconds    INR 0.92 0.90 - 1.10   aPTT   Result Value Ref Range    PTT 40.4 (H) 24.3 - 38.1 seconds   Troponin   Result Value Ref Range    Troponin T <0.010 0.000-<0.030 ng/mL   BNP   Result Value Ref Range    proBNP 77.8 5.0 - 900.0 pg/mL   CBC Auto Differential   Result Value Ref Range    WBC 4.44 3.40 - 10.80 10*3/mm3    RBC 4.65 3.77 - 5.28 10*6/mm3    Hemoglobin 12.6 12.0 - 15.9 g/dL     Hematocrit 40.1 34.0 - 46.6 %    MCV 86.2 79.0 - 97.0 fL    MCH 27.1 26.6 - 33.0 pg    MCHC 31.4 (L) 31.5 - 35.7 g/dL    RDW 14.9 12.3 - 15.4 %    RDW-SD 47.7 37.0 - 54.0 fl    MPV 9.6 6.0 - 12.0 fL    Platelets 215 140 - 450 10*3/mm3    Neutrophil % 51.1 42.7 - 76.0 %    Lymphocyte % 35.4 19.6 - 45.3 %    Monocyte % 9.9 5.0 - 12.0 %    Eosinophil % 2.7 0.3 - 6.2 %    Basophil % 0.9 0.0 - 1.5 %    Immature Grans % 0.0 0.0 - 0.5 %    Neutrophils, Absolute 2.27 1.70 - 7.00 10*3/mm3    Lymphocytes, Absolute 1.57 0.70 - 3.10 10*3/mm3    Monocytes, Absolute 0.44 0.10 - 0.90 10*3/mm3    Eosinophils, Absolute 0.12 0.00 - 0.40 10*3/mm3    Basophils, Absolute 0.04 0.00 - 0.20 10*3/mm3    Immature Grans, Absolute 0.00 0.00 - 0.05 10*3/mm3         I ordered the above labs and reviewed the results    RADIOLOGY  No results found.    I ordered the above radiologic testing and reviewed the results    PROCEDURES  Procedures      PROGRESS AND CONSULTS  ED Course as of Dec 11 1504   Wed Dec 11, 2019   1302 EKG tracing was contemporaneously reviewed at 1245 hrs. and showed a type II second-degree AV block with a ventricular rate of 54 bpm.  ST segments and T waves unremarkable.  EKG was compared to last available dated October 2018 and the second-degree block is new.  Rate on the EKG was 62 bpm.  Last medical contact in our department was July 7, 2019 and heart rate at that visit noted at 71 bpm.    [ML]   1427 Patient continues to have occasional dropped QRS complexes at a rate of 1-2/min.  Cardiology will be consulted concerning disposition.    [ML]   1438 Case and findings discussed with the on-call cardiologist, Dr. Samuel, who felt that as the patient was asymptomatic placing a Holter monitor with early follow-up would be the prudent course at this time.  Patient is agreeable.  Specific warnings concerning lightheadedness, chest pain and syncope discussed with patient.  Appointment has been made for December 16 at 9:15 AM.     [ML]      ED Course User Index  [ML] Néstor Cam MD           MEDICAL DECISION MAKING  Results were reviewed/discussed with the patient and they were also made aware of online access. Pt also made aware that some labs, such as cultures, will not be resulted during ER visit and follow up with PMD is necessary.     MDM       DIAGNOSIS  Final diagnoses:   Second degree AV block, Mobitz type II       Latest Documented Vital Signs:  As of 3:04 PM  BP- 151/85 HR- 55 Temp- 98.4 °F (36.9 °C) O2 sat- 100%    DISPOSITION  Discharged in stable condition       Medication List      Changed    levothyroxine 100 MCG tablet  Commonly known as:  SYNTHROID, LEVOTHROID  Take 1 tablet by mouth Daily.  What changed:  how much to take        Stop    doxycycline 100 MG capsule  Commonly known as:  VIBRAMYCIN     predniSONE 10 MG tablet  Commonly known as:  DELTASONE     SINUS RINSE KIT pack          Follow-up Information     Amaury Samuel III, MD.    Specialty:  Cardiology  Why:  Follow-up Monday as scheduled  Contact information:  [facility] [address] [address] [phone]                      Néstor Cam MD  12/11/19 6619

## 2019-12-11 NOTE — ED NOTES
Patient was scheduled for an appointment with [facility] on Monday, 12/16/2019 at 9:30 am. The patient needs to be there at 9:15 am to fill out paperwork.      Rita Ku  12/11/19 1459

## 2019-12-16 ENCOUNTER — OFFICE VISIT (OUTPATIENT)
Dept: CARDIOLOGY | Facility: CLINIC | Age: 59
End: 2019-12-16

## 2019-12-16 VITALS
WEIGHT: 222 LBS | DIASTOLIC BLOOD PRESSURE: 86 MMHG | HEIGHT: 66 IN | OXYGEN SATURATION: 99 % | HEART RATE: 68 BPM | BODY MASS INDEX: 35.68 KG/M2 | RESPIRATION RATE: 14 BRPM | SYSTOLIC BLOOD PRESSURE: 126 MMHG

## 2019-12-16 DIAGNOSIS — I49.9 IRREGULAR HEART BEAT: Primary | ICD-10-CM

## 2019-12-16 DIAGNOSIS — R94.31 ABNORMAL EKG: ICD-10-CM

## 2019-12-16 PROCEDURE — 99203 OFFICE O/P NEW LOW 30 MIN: CPT | Performed by: INTERNAL MEDICINE

## 2019-12-16 PROCEDURE — 93227 XTRNL ECG REC<48 HR R&I: CPT | Performed by: INTERNAL MEDICINE

## 2019-12-16 NOTE — PROGRESS NOTES
[facility]      Patient Name: Opal Bowen  :1960  Age: 59 y.o.  Encounter Provider:  Ronald Gardner Jr, MD      Chief Complaint:   Chief Complaint   Patient presents with   • Irregular Heart Beat   • Abnormal ECG         HPI  Opal Bowen is a 59 y.o. female past medical history of hypertension and hypothyroidism who presents for evaluation of abnormal EKG.  On  the patient was seen in her PCP office for routine follow-up and was found to have an occasional skipped beat.  Patient had abnormal EKG which was read by the computer as second-degree AV block type II but in actuality was showing a single dropped sinus beat and was sent to the emergency room.  Patient denies chest pain shortness of air palpitations.  She is very active and walks the stairs in her home only limited by knee pain.  She denies palpitations, dizziness or syncope.  No orthopnea PND or edema.  She is a lifelong non-smoker drinks socially and denies illicit drug use.  No family history of premature coronary artery disease or sudden cardiac death.      The following portions of the patient's history were reviewed and updated as appropriate: allergies, current medications, past family history, past medical history, past social history, past surgical history and problem list.      Review of Systems   Constitution: Positive for malaise/fatigue. Negative for chills and fever.   HENT: Positive for nosebleeds. Negative for hoarse voice and sore throat.    Eyes: Negative for double vision and photophobia.   Cardiovascular: Negative for chest pain, leg swelling, near-syncope, orthopnea, palpitations, paroxysmal nocturnal dyspnea and syncope.   Respiratory: Positive for cough. Negative for wheezing.    Skin: Negative for poor wound healing and rash.   Musculoskeletal: Positive for joint pain and myalgias. Negative for arthritis and joint swelling.   Gastrointestinal: Negative for bloating, abdominal pain,  "hematemesis and hematochezia.   Neurological: Positive for excessive daytime sleepiness, headaches, numbness and paresthesias. Negative for dizziness and focal weakness.   Psychiatric/Behavioral: Negative for depression and suicidal ideas.   All other systems reviewed and are negative.      OBJECTIVE:   Vital Signs  Vitals:    12/16/19 1034   BP: 126/86   Pulse: 68   Resp: 14   SpO2: 99%     Estimated body mass index is 35.83 kg/m² as calculated from the following:    Height as of this encounter: 167.6 cm (66\").    Weight as of this encounter: 101 kg (222 lb).    Physical Exam   Constitutional: She is oriented to person, place, and time. She appears well-developed and well-nourished.   HENT:   Head: Normocephalic and atraumatic.   Eyes: Pupils are equal, round, and reactive to light. Conjunctivae are normal.   Neck: No JVD present. No thyromegaly present.   Cardiovascular: Exam reveals no gallop and no friction rub.   No murmur heard.  Pulmonary/Chest: No respiratory distress. She exhibits no tenderness.   Abdominal: Bowel sounds are normal. She exhibits no distension.   Musculoskeletal: She exhibits edema. She exhibits no tenderness.   Trace nonpitting edema bilateral lower extremity   Neurological: She is alert and oriented to person, place, and time.   Skin: No rash noted. No erythema.   Psychiatric: She has a normal mood and affect. Judgment normal.   Vitals reviewed.              ASSESSMENT:      Diagnosis Plan   1. Irregular heart beat     2. Abnormal EKG           PLAN OF CARE:     1. Abnormal EKG -patient wore a Holter monitor for 24 hours which was handed in after 3 PM on Friday.  Will request records from [place].  If no significant conduction abnormality we will see the patient as needed.  2. Hypothyroidism -defer to primary care               Discharge Medications           Accurate as of December 16, 2019 10:42 AM. If you have any questions, ask your nurse or doctor.               Changes to " Medications      Instructions Start Date   levothyroxine 100 MCG tablet  Commonly known as:  SYNTHROID, LEVOTHROID  What changed:  how much to take   100 mcg, Oral, Daily         Continue These Medications      Instructions Start Date   lisinopril-hydrochlorothiazide 20-12.5 MG per tablet  Commonly known as:  PRINZIDE,ZESTORETIC   1 tablet, Oral, Daily      VITAMIN D2 PO   50,000 Units, Oral, Every 7 Days             Thank you for allowing me to participate in the care of your patient,      Sincerely,   Ronald Gardner MD  [facility]  12/16/19  10:42 AM

## 2020-02-10 ENCOUNTER — TRANSCRIBE ORDERS (OUTPATIENT)
Dept: ADMINISTRATIVE | Facility: HOSPITAL | Age: 60
End: 2020-02-10

## 2020-02-10 DIAGNOSIS — Z78.0 MENOPAUSE: Primary | ICD-10-CM

## 2020-02-10 DIAGNOSIS — Z12.31 VISIT FOR SCREENING MAMMOGRAM: ICD-10-CM

## 2020-02-27 ENCOUNTER — APPOINTMENT (OUTPATIENT)
Dept: BONE DENSITY | Facility: HOSPITAL | Age: 60
End: 2020-02-27

## 2020-02-27 ENCOUNTER — APPOINTMENT (OUTPATIENT)
Dept: MAMMOGRAPHY | Facility: HOSPITAL | Age: 60
End: 2020-02-27

## 2020-03-04 ENCOUNTER — HOSPITAL ENCOUNTER (OUTPATIENT)
Dept: MAMMOGRAPHY | Facility: HOSPITAL | Age: 60
Discharge: HOME | End: 2020-03-04
Admitting: FAMILY MEDICINE

## 2020-03-04 ENCOUNTER — APPOINTMENT (OUTPATIENT)
Dept: BONE DENSITY | Facility: HOSPITAL | Age: 60
End: 2020-03-04

## 2020-03-04 DIAGNOSIS — Z78.0 MENOPAUSE: ICD-10-CM

## 2020-03-04 DIAGNOSIS — Z12.31 VISIT FOR SCREENING MAMMOGRAM: ICD-10-CM

## 2020-03-04 PROCEDURE — 77080 DXA BONE DENSITY AXIAL: CPT

## 2020-03-04 PROCEDURE — 77067 SCR MAMMO BI INCL CAD: CPT

## 2020-03-04 PROCEDURE — 77063 BREAST TOMOSYNTHESIS BI: CPT

## 2020-03-10 ENCOUNTER — HOSPITAL ENCOUNTER (EMERGENCY)
Facility: HOSPITAL | Age: 60
Discharge: HOME | End: 2020-03-10
Attending: EMERGENCY MEDICINE | Admitting: EMERGENCY MEDICINE

## 2020-03-10 VITALS
WEIGHT: 215 LBS | BODY MASS INDEX: 34.55 KG/M2 | RESPIRATION RATE: 18 BRPM | SYSTOLIC BLOOD PRESSURE: 113 MMHG | HEART RATE: 80 BPM | DIASTOLIC BLOOD PRESSURE: 76 MMHG | OXYGEN SATURATION: 98 % | HEIGHT: 66 IN | TEMPERATURE: 97.8 F

## 2020-03-10 DIAGNOSIS — R05.9 COUGH: ICD-10-CM

## 2020-03-10 DIAGNOSIS — L50.9 HIVES: Primary | ICD-10-CM

## 2020-03-10 PROCEDURE — 25010000002 METHYLPREDNISOLONE PER 125 MG: Performed by: EMERGENCY MEDICINE

## 2020-03-10 PROCEDURE — 96372 THER/PROPH/DIAG INJ SC/IM: CPT

## 2020-03-10 PROCEDURE — 99283 EMERGENCY DEPT VISIT LOW MDM: CPT

## 2020-03-10 PROCEDURE — 99282 EMERGENCY DEPT VISIT SF MDM: CPT | Performed by: EMERGENCY MEDICINE

## 2020-03-10 RX ORDER — PREDNISONE 10 MG/1
TABLET ORAL
Qty: 21 TABLET | Refills: 0 | Status: SHIPPED | OUTPATIENT
Start: 2020-03-10 | End: 2020-03-10 | Stop reason: SDUPTHER

## 2020-03-10 RX ORDER — BENZONATATE 200 MG/1
200 CAPSULE ORAL 3 TIMES DAILY PRN
Qty: 21 CAPSULE | Refills: 0 | Status: SHIPPED | OUTPATIENT
Start: 2020-03-10 | End: 2020-03-10 | Stop reason: SDUPTHER

## 2020-03-10 RX ORDER — BENZONATATE 200 MG/1
200 CAPSULE ORAL 3 TIMES DAILY PRN
Qty: 21 CAPSULE | Refills: 0 | Status: SHIPPED | OUTPATIENT
Start: 2020-03-10 | End: 2020-03-17

## 2020-03-10 RX ORDER — BACLOFEN 10 MG/1
10 TABLET ORAL 3 TIMES DAILY
COMMUNITY
End: 2021-11-21

## 2020-03-10 RX ORDER — METHYLPREDNISOLONE SODIUM SUCCINATE 125 MG/2ML
80 INJECTION, POWDER, LYOPHILIZED, FOR SOLUTION INTRAMUSCULAR; INTRAVENOUS ONCE
Status: COMPLETED | OUTPATIENT
Start: 2020-03-10 | End: 2020-03-10

## 2020-03-10 RX ORDER — ATORVASTATIN CALCIUM 20 MG/1
20 TABLET, FILM COATED ORAL DAILY
COMMUNITY
End: 2020-08-26

## 2020-03-10 RX ORDER — PREDNISONE 10 MG/1
TABLET ORAL
Qty: 21 TABLET | Refills: 0 | Status: SHIPPED | OUTPATIENT
Start: 2020-03-10 | End: 2020-08-26

## 2020-03-10 RX ORDER — BENZONATATE 100 MG/1
200 CAPSULE ORAL ONCE
Status: COMPLETED | OUTPATIENT
Start: 2020-03-10 | End: 2020-03-10

## 2020-03-10 RX ORDER — MELOXICAM 15 MG/1
15 TABLET ORAL DAILY
COMMUNITY
End: 2020-08-26 | Stop reason: SDUPTHER

## 2020-03-10 RX ORDER — FLUTICASONE PROPIONATE 50 MCG
2 SPRAY, SUSPENSION (ML) NASAL DAILY
Status: ON HOLD | COMMUNITY
End: 2022-08-11

## 2020-03-10 RX ADMIN — BENZONATATE 200 MG: 100 CAPSULE ORAL at 22:54

## 2020-03-10 RX ADMIN — METHYLPREDNISOLONE SODIUM SUCCINATE 80 MG: 125 INJECTION, POWDER, FOR SOLUTION INTRAMUSCULAR; INTRAVENOUS at 22:54

## 2020-03-11 NOTE — DISCHARGE INSTRUCTIONS
Medication as directed.  Follow-up with Dr. Storey as above.  Return to ED for medical emergencies.

## 2020-03-11 NOTE — ED PROVIDER NOTES
Subjective   Opal Bowen is a 58 yo BF who presents secondary to hives and itching.  Onset this evening.  Patient first noticed itching and then hives anterior neck and face.  Patient has a known allergy to antihistamines.  She was recently seen and treated by Dr. Storey for a sinus infection and URI.  She realized that the over-the-counter medicine she was taking contained antihistamines.  However last dose was approximately 1 week ago.  Patient reports she still has a cough which is productive.  Sometimes clear and sometimes green sputum.  She is afebrile.  No difficulty swallowing.  No shortness of breath.  Patient presents for evaluation.      History provided by:  Patient      Review of Systems   Constitutional: Negative for chills and fever.   HENT: Negative for rhinorrhea.    Eyes: Negative for redness.   Respiratory: Positive for cough. Negative for choking, chest tightness, shortness of breath, wheezing and stridor.    Cardiovascular: Negative for chest pain, palpitations and leg swelling.   Gastrointestinal: Negative for abdominal pain.   Genitourinary: Negative for dysuria.   Musculoskeletal: Negative for back pain.   Skin: Negative for color change.   Neurological: Negative for syncope.   All other systems reviewed and are negative.      Past Medical History:   Diagnosis Date   • Bowel obstruction (CMS/HCC) 2016    h/o   • Colon polyp    • Disease of thyroid gland     hypothyroidism   • Heart murmur    • Hyperlipidemia    • PONV (postoperative nausea and vomiting)     had after w/pain pill, not sure what type       Allergies   Allergen Reactions   • Claritin [Loratadine] Hives   • Other Hives     Pt states all Antihistamines cause hives       Past Surgical History:   Procedure Laterality Date   • APPENDECTOMY     • CHEST WALL MASS EXCISION Right 10/17/2018    Procedure: EXCISION OF RIGHT CHEST WALL MASS;  Surgeon: Tim Palencia MD;  Location: [facility];  Service: General   • COLON SURGERY  2016     states piece removed d/t obstruction/scar tissue   • COLONOSCOPY N/A 11/28/2016    Procedure: COLONOSCOPY with polypectomy;  Surgeon: Kristy Caballero MD;  Location: [facility];  Service:    • OVARIAN CYST SURGERY      x2       Family History   Problem Relation Age of Onset   • Stroke Mother    • Hypertension Mother    • Alzheimer's disease Mother    • Prostate cancer Father    • Heart disease Father         pacemaker   • Colon polyps Sister    • Hypertension Brother    • Prostate cancer Brother    • Malig Hyperthermia Neg Hx    • Breast cancer Neg Hx        Social History     Socioeconomic History   • Marital status:      Spouse name: Not on file   • Number of children: Not on file   • Years of education: Not on file   • Highest education level: Not on file   Tobacco Use   • Smoking status: Never Smoker   • Smokeless tobacco: Never Used   Substance and Sexual Activity   • Alcohol use: Yes     Comment: rarely   • Drug use: No   • Sexual activity: Defer           Objective   Physical Exam   Constitutional: She is oriented to person, place, and time. She appears well-developed and well-nourished. No distress.   59-year-old black female sitting on side of the bed.  Patient appears in good overall health for age.  She is a bit overweight.  Vital signs unremarkable.  Patient is speaking full sentences without any difficulty.  She is handling her secretions with no difficulty.   HENT:   Head: Normocephalic and atraumatic.   Right Ear: External ear normal.   Left Ear: External ear normal.   Nose: Nose normal.   Mouth/Throat: Oropharynx is clear and moist.   Eyes: Pupils are equal, round, and reactive to light. Conjunctivae and EOM are normal.   Neck: Normal range of motion. Neck supple.   Cardiovascular: Normal rate, regular rhythm, normal heart sounds and intact distal pulses. Exam reveals no gallop and no friction rub.   No murmur heard.  Pulmonary/Chest: Effort normal and breath sounds normal.   Abdominal: Soft.  Bowel sounds are normal. She exhibits no distension. There is no tenderness.   Musculoskeletal: Normal range of motion.   Neurological: She is alert and oriented to person, place, and time. She has normal reflexes.   Skin: Skin is warm and dry. Rash noted. No purpura noted. Rash is urticarial. Rash is not papular, not maculopapular, not nodular, not pustular and not vesicular. She is not diaphoretic. No cyanosis. Nails show no clubbing.        Psychiatric: She has a normal mood and affect. Her behavior is normal.   Nursing note and vitals reviewed.      Procedures           ED Course  ED Course as of Mar 10 2304   Tue Mar 10, 2020   9152 Patient has a few scattered hives on face and neck.  She has a known allergy to antihistamines.  However her last dose of antihistamines was 1 week ago.  Patient states she just completed a Dosepak of steroids prescribed by Dr. Storey for a sinus infection.  Patient requested IM steroids over p.o.  Also giving Tessalon Perles for cough.  Patient is not driving.     Prescriptions1-prednisone Dosepak2-Tessalon Perles    [SS]      ED Course User Index  [SS] Prakash Beck MD                                           [facility]    Final diagnoses:   Hives   Cough            Prakash Beck MD  03/10/20 2301

## 2020-03-11 NOTE — ED NOTES
"The pt arrived to the ED ambulatory, accompanied by her spouse. The pt reports that she is \"breaking out into hives\" on her neck. The pt states \"Im allergic to antihistamines I think something I took has some in it\" the pt does not appears to have hives on her neck. No redness or any kind of raised area. The pt c/o a cough x 1 month. Ans sore throat x 2 weeks. The pt states \"I think I have been drinking too much theraflu\" The pt appears to be in NAD. Pt is controlling secretions, maintaining airway, and conversing without difficulty.     Evette Baker, RN  03/10/20 5294    "

## 2020-06-02 ENCOUNTER — TRANSCRIBE ORDERS (OUTPATIENT)
Dept: ADMINISTRATIVE | Facility: HOSPITAL | Age: 60
End: 2020-06-02

## 2020-06-02 DIAGNOSIS — Z12.31 VISIT FOR SCREENING MAMMOGRAM: Primary | ICD-10-CM

## 2020-08-26 ENCOUNTER — OFFICE VISIT (OUTPATIENT)
Dept: ORTHOPEDIC SURGERY | Facility: CLINIC | Age: 60
End: 2020-08-26

## 2020-08-26 VITALS
HEIGHT: 66 IN | BODY MASS INDEX: 34.55 KG/M2 | SYSTOLIC BLOOD PRESSURE: 132 MMHG | WEIGHT: 215 LBS | HEART RATE: 69 BPM | DIASTOLIC BLOOD PRESSURE: 77 MMHG

## 2020-08-26 DIAGNOSIS — R52 PAIN: Primary | ICD-10-CM

## 2020-08-26 DIAGNOSIS — M17.0 PRIMARY OSTEOARTHRITIS OF KNEES, BILATERAL: ICD-10-CM

## 2020-08-26 PROCEDURE — 20610 DRAIN/INJ JOINT/BURSA W/O US: CPT | Performed by: NURSE PRACTITIONER

## 2020-08-26 PROCEDURE — 99203 OFFICE O/P NEW LOW 30 MIN: CPT | Performed by: NURSE PRACTITIONER

## 2020-08-26 PROCEDURE — 73562 X-RAY EXAM OF KNEE 3: CPT | Performed by: NURSE PRACTITIONER

## 2020-08-26 RX ORDER — MELOXICAM 15 MG/1
15 TABLET ORAL DAILY
Qty: 30 TABLET | Refills: 3 | OUTPATIENT
Start: 2020-08-26 | End: 2021-11-21

## 2020-08-26 RX ORDER — ROSUVASTATIN CALCIUM 10 MG/1
10 TABLET, COATED ORAL DAILY
COMMUNITY
Start: 2020-08-13

## 2020-08-26 RX ADMIN — LIDOCAINE HYDROCHLORIDE 8 ML: 10 INJECTION, SOLUTION EPIDURAL; INFILTRATION; INTRACAUDAL; PERINEURAL at 14:31

## 2020-08-26 RX ADMIN — TRIAMCINOLONE ACETONIDE 80 MG: 40 INJECTION, SUSPENSION INTRA-ARTICULAR; INTRAMUSCULAR at 14:31

## 2020-08-26 NOTE — PROGRESS NOTES
Subjective:     Patient ID: Opal Bowen is a 59 y.o. female.    Chief Complaint:  Bilateral knee pain   History of Present Illness  Opal Bowen 59-year-old female presents to clinic for evaluation of her left knee as referred by primary care.  Pain gets at the left knee does radiate down the foot he has a pulling sensation of the posterior aspect of the knee joint.  Pain is been present to the severity of the last 2 to 2-1/2 years.  Rates discomfort 9-10 out of a 10 described as stabbing, sharp, shooting in nature.  She does feel tightness of the posterior aspect of her calf worse with weightbearing activities, full extension, deep flexion, ascending and descending steps when ambulating on uneven surfaces, standing for extended periods of time, ambulating long distances.  Has received corticosteroid injections in the past as well as Visco supplementation injections.  She is also completed stem cell treatments in the past several years ago outside facility when she woke in [place].  Denies any recent corticosteroid injections, Visco supplementation injections.  Not currently taking anti-inflammatory medications for symptom relief.  Denies any recent x-ray, MRI, CT.  She is experiencing pain at bilateral knees left knee is greater amount of the right at this time.  Pain is not radiating to her groin or to the lateral aspect of bilateral hips.  Denies any previous surgical intervention.  Denies other concerns presently.    Social History     Occupational History   • Not on file   Tobacco Use   • Smoking status: Never Smoker   • Smokeless tobacco: Never Used   Substance and Sexual Activity   • Alcohol use: Yes     Comment: rarely   • Drug use: No   • Sexual activity: Defer      Past Medical History:   Diagnosis Date   • Bowel obstruction (CMS/HCC) 2016    h/o   • Colon polyp    • Disease of thyroid gland     hypothyroidism   • Heart murmur    • Hyperlipidemia    • PONV (postoperative nausea and vomiting)      had after w/pain pill, not sure what type     Past Surgical History:   Procedure Laterality Date   • APPENDECTOMY     • CHEST WALL MASS EXCISION Right 10/17/2018    Procedure: EXCISION OF RIGHT CHEST WALL MASS;  Surgeon: Tim Palencia MD;  Location: [facility] OR;  Service: General   • COLON SURGERY  2016    states piece removed d/t obstruction/scar tissue   • COLONOSCOPY N/A 11/28/2016    Procedure: COLONOSCOPY with polypectomy;  Surgeon: Kristy Caballero MD;  Location: [facility] OR;  Service:    • OVARIAN CYST SURGERY      x2       Family History   Problem Relation Age of Onset   • Stroke Mother    • Hypertension Mother    • Alzheimer's disease Mother    • Prostate cancer Father    • Heart disease Father         pacemaker   • Colon polyps Sister    • Hypertension Brother    • Prostate cancer Brother    • Malig Hyperthermia Neg Hx    • Breast cancer Neg Hx          Review of Systems   Constitutional: Negative for chills, diaphoresis, fever and unexpected weight change.   HENT: Positive for nosebleeds. Negative for hearing loss, sore throat and tinnitus.    Eyes: Negative for pain and visual disturbance.   Respiratory: Negative for cough, shortness of breath and wheezing.    Cardiovascular: Negative for chest pain and palpitations.   Gastrointestinal: Negative for abdominal pain, diarrhea, nausea and vomiting.   Endocrine: Negative for cold intolerance, heat intolerance and polydipsia.   Genitourinary: Negative for difficulty urinating, dysuria and hematuria.   Musculoskeletal: Positive for joint swelling and myalgias. Negative for arthralgias.   Skin: Negative for rash and wound.   Allergic/Immunologic: Negative for environmental allergies.   Neurological: Positive for numbness. Negative for dizziness and syncope.   Hematological: Does not bruise/bleed easily.   Psychiatric/Behavioral: Negative for dysphoric mood and sleep disturbance. The patient is not nervous/anxious.            Objective:  Physical Exam    Vital  "signs reviewed.   General: No acute distress.  Eyes: conjunctiva clear; pupils equally round and reactive  ENT: external ears and nose atraumatic; oropharynx clear  CV: no peripheral edema  Resp: normal respiratory effort  Skin: no rashes or wounds; normal turgor  Psych: mood and affect appropriate; recent and remote memory intact    Vitals:    08/26/20 1357   BP: 132/77   BP Location: Right arm   Pulse: 69   Weight: 97.5 kg (215 lb)   Height: 167.6 cm (66\")         08/26/20  1357   Weight: 97.5 kg (215 lb)     Body mass index is 34.7 kg/m².      Right Knee Exam     Tenderness   The patient is experiencing tenderness in the medial joint line and patella.    Range of Motion   Extension: 0   Flexion: 130     Tests   Bernardino:  Medial - negative Lateral - negative  Varus: negative Valgus: negative  Lachman:  Anterior - 1+    Posterior - negative  Drawer:  Anterior - negative    Posterior - negative  Patellar apprehension: positive    Other   Erythema: absent  Sensation: normal  Pulse: present  Swelling: moderate  Effusion: no effusion present    Comments:  Positive tenderness lateral and medial patellar facet  Positive active patellar compression test  Positive crepitus throughout arc of motion       Left Knee Exam     Tenderness   The patient is experiencing tenderness in the medial joint line and patella.    Range of Motion   Extension: 0   Flexion: 130     Tests   Bernardino:  Medial - negative Lateral - negative  Varus: negative Valgus: negative  Lachman:  Anterior - 1+    Posterior - negative  Drawer:  Anterior - negative     Posterior - negative  Patellar apprehension: positive    Other   Erythema: absent  Sensation: normal  Pulse: present  Swelling: moderate  Effusion: no effusion present    Comments:  Positive tenderness posterior joint line, lateral and medial patellar facet  Positive active patellar compression test  Positive crepitus throughout arc of motion                  Imaging:  Left Knee " X-Ray  Indication: Pain    AP, Lateral, and [place] views    Findings:  No fracture  Normal soft tissues  Advanced osteoarthritis medial and patellofemoral joint with near bone-on-bone articulation with osteophytes at medial femoral condyle, medial tibial plateau, posterior joint line, inferior and superior patellar pole     No prior studies were available for comparison.    Assessment:        1. Pain    2. Primary osteoarthritis of knees, bilateral           Plan:  1. Discussed plan of care with patient. Wishes to proceed with corticosteroid injections bilateral knees. Will start meloxicam 15 mg once daily with food, encouraged avoidance of all other NSAID's including but not limited to ibuprofen, Advil, naproxen, aleve and others. Will plan to see her back in clinic in 4 weeks to reevaluate, will need x-ray right knee at follow-up. Encouraged to call with concerns/questions between now and follow-up. Discussed application of ice at injection sites bilaterally and continue with ROM activities. Patient verbalized understanding of all information and agrees with plan of care. Denies all other concerns present at this time.     Large Joint Arthrocentesis  Date/Time: 8/26/2020 2:31 PM  Consent given by: patient  Site marked: site marked  Timeout: Immediately prior to procedure a time out was called to verify the correct patient, procedure, equipment, support staff and site/side marked as required   Supporting Documentation  Indications: pain and joint swelling   Procedure Details  Location: knee - Knee joint: Bilatearl knees   Preparation: Patient was prepped and draped in the usual sterile fashion  Approach: superolateral.  Medications administered: 8 mL lidocaine PF 1% 1 %; 80 mg triamcinolone acetonide 40 MG/ML  Patient tolerance: patient tolerated the procedure well with no immediate complications          Orders:  Orders Placed This Encounter   Procedures   • Large Joint Arthrocentesis   • XR Knee 3 View Left        Medications:  New Medications Ordered This Visit   Medications   • meloxicam (MOBIC) 15 MG tablet     Sig: Take 1 tablet by mouth Daily.     Dispense:  30 tablet     Refill:  3       Followup:  No follow-ups on file.    Opal was seen today for pain, pain, numbness and pain.    Diagnoses and all orders for this visit:    Pain  -     XR Knee 3 View Left    Primary osteoarthritis of knees, bilateral  -     Large Joint Arthrocentesis    Other orders  -     meloxicam (MOBIC) 15 MG tablet; Take 1 tablet by mouth Daily.          I ordered and reviewed the DADA today.     Dictated utilizing Dragon dictation

## 2020-08-30 RX ORDER — TRIAMCINOLONE ACETONIDE 40 MG/ML
80 INJECTION, SUSPENSION INTRA-ARTICULAR; INTRAMUSCULAR
Status: COMPLETED | OUTPATIENT
Start: 2020-08-26 | End: 2020-08-26

## 2020-08-30 RX ORDER — LIDOCAINE HYDROCHLORIDE 10 MG/ML
8 INJECTION, SOLUTION EPIDURAL; INFILTRATION; INTRACAUDAL; PERINEURAL
Status: COMPLETED | OUTPATIENT
Start: 2020-08-26 | End: 2020-08-26

## 2020-08-30 NOTE — PROGRESS NOTES
62-year-old female presents to clinic for evaluation of her left knee as referred by primary care. Pain gets at the left knee does radiate down the foot he has a pulling sensation of the posterior aspect of the knee joint. Pain is been present to the severity of the last 2 to 2-1/2 years. Rates discomfort 9-10 out of a 10 described as stabbing, sharp, shooting in nature. She does feel tightness of the posterior aspect of her calf worse with weightbearing activities, full extension, deep flexion, ascending and descending steps when ambulating on uneven surfaces, standing for extended periods of time, ambulating long distances. Has received corticosteroid injections in the past as well as Visco supplementation injections. She is also completed stem cell treatments in the past several years ago outside facility when she woke in [place]. Denies any recent corticosteroid injections, Visco supplementation injections. Not currently taking anti-inflammatory medications for symptom relief. Denies any recent x-ray, MRI, CT. She is experiencing pain at bilateral knees left knee is greater amount of the right at this time. Pain is not radiating to her groin or to the lateral aspect of bilateral hips. Denies any previous surgical intervention. Denies other concerns presently.

## 2020-09-23 ENCOUNTER — OFFICE VISIT (OUTPATIENT)
Dept: ORTHOPEDIC SURGERY | Facility: CLINIC | Age: 60
End: 2020-09-23

## 2020-09-23 VITALS — BODY MASS INDEX: 34.55 KG/M2 | WEIGHT: 215 LBS | HEIGHT: 66 IN

## 2020-09-23 DIAGNOSIS — M17.0 PRIMARY OSTEOARTHRITIS OF KNEES, BILATERAL: ICD-10-CM

## 2020-09-23 PROCEDURE — 99214 OFFICE O/P EST MOD 30 MIN: CPT | Performed by: NURSE PRACTITIONER

## 2020-09-23 PROCEDURE — 73562 X-RAY EXAM OF KNEE 3: CPT | Performed by: NURSE PRACTITIONER

## 2020-09-23 RX ORDER — ERGOCALCIFEROL 1.25 MG/1
50000 CAPSULE ORAL WEEKLY
COMMUNITY
Start: 2020-09-16

## 2020-09-23 RX ORDER — LEVOTHYROXINE SODIUM 100 MCG
100 TABLET ORAL
COMMUNITY
Start: 2020-08-29

## 2020-09-23 NOTE — PROGRESS NOTES
Subjective:     Patient ID: Opal Bowen is a 59 y.o. female.    Chief Complaint:  Follow-up DJD bilateral knees  History of Present Illness  Opal Bowen returns to clinic for follow-up bilateral knee pain.  Continues to experience maximal tenderness at the medial compartment and anterior aspect right knee pain greater today than left.  Maximal tenderness is rated at a 10 out of 10 aching, throbbing, sharp, shooting in nature with pain shooting inferiorly from the medial compartment down to the anterior aspect of the tibia.  Denies that the knees are locking, catching does feel weakness bilateral lower extremities and pain worse at night when she is attempting to sleep due to the throbbing.  She did receive corticosteroid injections last visit with minimal symptom relief bilateral knees.  Did not  the meloxicam yet to start.  Has continued ambulatory activities again with pretty severe pain throughout the day.  Is not yet ready to proceed with total joint replacement surgery.  Denies all other concerns present time.    Social History     Occupational History   • Not on file   Tobacco Use   • Smoking status: Never Smoker   • Smokeless tobacco: Never Used   Substance and Sexual Activity   • Alcohol use: Yes     Comment: rarely   • Drug use: No   • Sexual activity: Defer      Past Medical History:   Diagnosis Date   • Bowel obstruction (CMS/HCC) 2016    h/o   • Colon polyp    • Disease of thyroid gland     hypothyroidism   • Heart murmur    • Hyperlipidemia    • PONV (postoperative nausea and vomiting)     had after w/pain pill, not sure what type     Past Surgical History:   Procedure Laterality Date   • APPENDECTOMY     • CHEST WALL MASS EXCISION Right 10/17/2018    Procedure: EXCISION OF RIGHT CHEST WALL MASS;  Surgeon: Tim Palencia MD;  Location: [facility];  Service: General   • COLON SURGERY  2016    states piece removed d/t obstruction/scar tissue   • COLONOSCOPY N/A 11/28/2016    Procedure:  "COLONOSCOPY with polypectomy;  Surgeon: Kristy Caballero MD;  Location: [facility];  Service:    • OVARIAN CYST SURGERY      x2       Family History   Problem Relation Age of Onset   • Stroke Mother    • Hypertension Mother    • Alzheimer's disease Mother    • Prostate cancer Father    • Heart disease Father         pacemaker   • Colon polyps Sister    • Hypertension Brother    • Prostate cancer Brother    • Malig Hyperthermia Neg Hx    • Breast cancer Neg Hx          Review of Systems   Constitutional: Negative for chills, diaphoresis, fever and unexpected weight change.   HENT: Negative for hearing loss, nosebleeds, sore throat and tinnitus.    Eyes: Negative for pain and visual disturbance.   Respiratory: Negative for cough, shortness of breath and wheezing.    Cardiovascular: Negative for chest pain and palpitations.   Gastrointestinal: Negative for abdominal pain, diarrhea, nausea and vomiting.   Endocrine: Negative for cold intolerance, heat intolerance and polydipsia.   Genitourinary: Negative for difficulty urinating, dysuria and hematuria.   Musculoskeletal: Positive for arthralgias, back pain and myalgias. Negative for joint swelling.   Skin: Negative for rash and wound.   Allergic/Immunologic: Negative for environmental allergies.   Neurological: Negative for dizziness, syncope and numbness.   Hematological: Does not bruise/bleed easily.   Psychiatric/Behavioral: Negative for dysphoric mood and sleep disturbance. The patient is not nervous/anxious.            Objective:  Physical Exam  General: No acute distress.  Eyes: conjunctiva clear; pupils equally round and reactive  ENT: external ears and nose atraumatic; oropharynx clear  CV: no peripheral edema  Resp: normal respiratory effort  Skin: no rashes or wounds; normal turgor  Psych: mood and affect appropriate; recent and remote memory intact    Vitals:    09/23/20 0949   Weight: 97.5 kg (215 lb)   Height: 167.6 cm (66\")         09/23/20 0949   Weight: " 97.5 kg (215 lb)     Body mass index is 34.7 kg/m².      Right Knee Exam     Tenderness   The patient is experiencing tenderness in the medial joint line.    Range of Motion   Extension: 0   Flexion: 130     Tests   Bernardino:  Medial - positive Lateral - negative  Varus: negative Valgus: negative  Lachman:  Anterior - 1+    Posterior - negative  Drawer:  Anterior - negative    Posterior - negative  Patellar apprehension: positive    Other   Erythema: absent  Sensation: normal  Pulse: present  Swelling: moderate  Effusion: no effusion present    Comments:  Positive tenderness lateral and medial patellar facet  Positive active patellar compression test  Positive crepitus throughout arc of motion       Left Knee Exam     Tenderness   The patient is experiencing tenderness in the medial joint line.    Range of Motion   Extension: 0   Flexion: 130     Tests   Bernardino:  Medial - negative Lateral - negative  Varus: negative Valgus: negative  Lachman:  Anterior - 1+    Posterior - negative  Drawer:  Anterior - negative     Posterior - negative  Patellar apprehension: positive    Other   Erythema: absent  Sensation: normal  Pulse: present  Swelling: moderate  Effusion: no effusion present    Comments:  Positive tenderness posterior joint line, lateral and medial patellar facet  Positive active patellar compression test  Positive crepitus throughout arc of motion                  Imaging:  Right Knee X-Ray  Indication: Pain    AP, Lateral, and [place] views    Findings:  No fracture  Normal soft tissues  Advanced tricompartmental osteoarthritis with bone-on-bone articulation of the medial compartment osteophyte present medial femoral condyle, inferior superior patellar pole with posterior joint line changes, areas of calcific tendinitis quad tendon insertion of patella and patellar tendon    No prior studies were available for comparison.   Assessment:        1. Primary osteoarthritis of knees, bilateral           Plan:  1.  Discussed plan of care with patient.  Wishes to proceed with submission for Visco supplementation injections bilateral knees.  Again did discuss with patient to purchase the knee meloxicam previously sent 10 which will help further reduce pain and swelling bilateral knees.  I do see her back in clinic after authorization for Visco supplementation injections approved to start series.  She verbalized understanding of information has been of care.  Denies other concerns present this time.  Orders:  Orders Placed This Encounter   Procedures   • XR Knee 3 View Right   • Visco Treatment       Medications:  No orders of the defined types were placed in this encounter.      Followup:  No follow-ups on file.    Opal was seen today for follow-up, pain, follow-up, pain, follow-up and pain.    Diagnoses and all orders for this visit:    Primary osteoarthritis of knees, bilateral  -     XR Knee 3 View Right  -     Visco Treatment; Future      Dictated utilizing Dragon dictation

## 2021-02-05 ENCOUNTER — HOSPITAL ENCOUNTER (EMERGENCY)
Facility: HOSPITAL | Age: 61
Discharge: HOME | End: 2021-02-05
Attending: EMERGENCY MEDICINE | Admitting: EMERGENCY MEDICINE

## 2021-02-05 VITALS
HEART RATE: 71 BPM | TEMPERATURE: 98.3 F | RESPIRATION RATE: 18 BRPM | OXYGEN SATURATION: 98 % | BODY MASS INDEX: 33.75 KG/M2 | SYSTOLIC BLOOD PRESSURE: 135 MMHG | HEIGHT: 66 IN | DIASTOLIC BLOOD PRESSURE: 108 MMHG | WEIGHT: 210 LBS

## 2021-02-05 DIAGNOSIS — T78.40XA ALLERGIC REACTION, INITIAL ENCOUNTER: Primary | ICD-10-CM

## 2021-02-05 PROCEDURE — 99283 EMERGENCY DEPT VISIT LOW MDM: CPT

## 2021-02-05 PROCEDURE — 96372 THER/PROPH/DIAG INJ SC/IM: CPT

## 2021-02-05 PROCEDURE — 99283 EMERGENCY DEPT VISIT LOW MDM: CPT | Performed by: EMERGENCY MEDICINE

## 2021-02-05 PROCEDURE — 25010000002 METHYLPREDNISOLONE PER 40 MG: Performed by: EMERGENCY MEDICINE

## 2021-02-05 RX ORDER — PREDNISONE 10 MG/1
TABLET ORAL
Qty: 21 TABLET | Refills: 0 | Status: SHIPPED | OUTPATIENT
Start: 2021-02-05 | End: 2021-05-01

## 2021-02-05 RX ORDER — FAMOTIDINE 20 MG/1
40 TABLET, FILM COATED ORAL ONCE
Status: COMPLETED | OUTPATIENT
Start: 2021-02-05 | End: 2021-02-05

## 2021-02-05 RX ORDER — METHYLPREDNISOLONE SODIUM SUCCINATE 40 MG/ML
80 INJECTION, POWDER, LYOPHILIZED, FOR SOLUTION INTRAMUSCULAR; INTRAVENOUS ONCE
Status: COMPLETED | OUTPATIENT
Start: 2021-02-05 | End: 2021-02-05

## 2021-02-05 RX ORDER — FAMOTIDINE 40 MG/1
40 TABLET, FILM COATED ORAL DAILY
Qty: 7 TABLET | Refills: 0 | Status: SHIPPED | OUTPATIENT
Start: 2021-02-05 | End: 2021-02-12

## 2021-02-05 RX ADMIN — METHYLPREDNISOLONE SODIUM SUCCINATE 80 MG: 40 INJECTION, POWDER, FOR SOLUTION INTRAMUSCULAR; INTRAVENOUS at 02:18

## 2021-02-05 RX ADMIN — FAMOTIDINE 40 MG: 20 TABLET, FILM COATED ORAL at 02:18

## 2021-02-05 NOTE — ED NOTES
Pt has new onset itchy raised bumps to L arm, L leg and face since 830pm. She says she took 1 benadryl at home but it did not help. She laid down to try to sleep but could not so came in to be seen. She has no airway difficulties, is A&Ox4, ambulates with a steady gait and is in no acute distress at this time. She has several small pin point sized bumps on her arm and face however no other signs of progressive rash. No swelling to skin or obvious signs of irritation. She says she ate lobster tonight for dinner but has never had a reaction to lobster, or any shellfish, in the past.      Mile Newton, RN  02/05/21 0628

## 2021-02-05 NOTE — DISCHARGE INSTRUCTIONS
Recommend you avoid shellfish until being seen by an allergist.  Medications as directed.  Follow-up with your PCP as above.  Follow-up with allergist as discussed.  Return to ED for worsening symptoms, medical emergencies.

## 2021-02-05 NOTE — ED PROVIDER NOTES
Subjective   Opal Bowen is a 60-year-old black female who presents secondary to allergic reaction.  Patient reports onset of hives and itching since 8: 30 p.m.  Patient took one Benadryl without significant improvement.  No shortness of breath.  No difficulty swallowing.  Patient elected to come to the emergency room for evaluation.    Patient's only known allergen is antihistamines.  Patient states she cannot take Zyrtec, Allegra or Claritin.  These all cause itching and hives.  Patient ate lobster for dinner this evening.  She has had shellfish many times in the past without any allergic reaction.      History provided by:  Patient      Review of Systems   Constitutional: Negative.  Negative for fever.   HENT: Negative.  Negative for rhinorrhea.    Eyes: Negative.  Negative for redness.   Respiratory: Negative for cough.    Cardiovascular: Negative for chest pain.   Gastrointestinal: Negative for abdominal pain.   Endocrine: Negative.    Genitourinary: Negative.  Negative for difficulty urinating.   Musculoskeletal: Negative.  Negative for back pain.   Skin: Positive for rash. Negative for color change.   Neurological: Negative.  Negative for syncope.   Hematological: Negative.    Psychiatric/Behavioral: Negative.    All other systems reviewed and are negative.      Past Medical History:   Diagnosis Date   • Bowel obstruction (CMS/HCC) 2016    h/o   • Colon polyp    • Disease of thyroid gland     hypothyroidism   • Heart murmur    • Hyperlipidemia    • PONV (postoperative nausea and vomiting)     had after w/pain pill, not sure what type       Allergies   Allergen Reactions   • Claritin [Loratadine] Hives   • Other Hives     Pt states all Antihistamines cause hives       Past Surgical History:   Procedure Laterality Date   • APPENDECTOMY     • CHEST WALL MASS EXCISION Right 10/17/2018    Procedure: EXCISION OF RIGHT CHEST WALL MASS;  Surgeon: Tim Palencia MD;  Location: [facility];  Service: General   •  COLON SURGERY  2016    states piece removed d/t obstruction/scar tissue   • COLONOSCOPY N/A 11/28/2016    Procedure: COLONOSCOPY with polypectomy;  Surgeon: Kristy Caballero MD;  Location: [facility];  Service:    • OVARIAN CYST SURGERY      x2       Family History   Problem Relation Age of Onset   • Stroke Mother    • Hypertension Mother    • Alzheimer's disease Mother    • Prostate cancer Father    • Heart disease Father         pacemaker   • Colon polyps Sister    • Hypertension Brother    • Prostate cancer Brother    • Malig Hyperthermia Neg Hx    • Breast cancer Neg Hx        Social History     Socioeconomic History   • Marital status:      Spouse name: Not on file   • Number of children: Not on file   • Years of education: Not on file   • Highest education level: Not on file   Tobacco Use   • Smoking status: Never Smoker   • Smokeless tobacco: Never Used   Substance and Sexual Activity   • Alcohol use: Yes     Comment: rarely   • Drug use: No   • Sexual activity: Defer           Objective   Physical Exam  Vitals signs and nursing note reviewed.   Constitutional:       General: She is not in acute distress.     Appearance: Normal appearance. She is well-developed. She is not diaphoretic.      Comments: 60-year-old black female sitting in bed.  Patient is a bit overweight.  She otherwise appears in good health.  Vital signs notable for BP of 135/108.  Otherwise unremarkable.  Patient is friendly and cooperative.  She is not in any distress.  Speaking full sentences easily.   HENT:      Head: Normocephalic and atraumatic.      Right Ear: Tympanic membrane, ear canal and external ear normal.      Left Ear: Tympanic membrane, ear canal and external ear normal.      Nose: Nose normal.      Mouth/Throat:      Mouth: Mucous membranes are moist.      Pharynx: Oropharynx is clear.   Eyes:      Extraocular Movements: Extraocular movements intact.      Conjunctiva/sclera: Conjunctivae normal.      Pupils: Pupils are  equal, round, and reactive to light.   Neck:      Musculoskeletal: Normal range of motion and neck supple.   Cardiovascular:      Rate and Rhythm: Normal rate and regular rhythm.      Pulses: Normal pulses.      Heart sounds: Normal heart sounds. No murmur. No friction rub. No gallop.    Pulmonary:      Effort: Pulmonary effort is normal.      Breath sounds: Normal breath sounds.   Abdominal:      General: Bowel sounds are normal. There is no distension.      Palpations: Abdomen is soft.      Tenderness: There is no abdominal tenderness.   Musculoskeletal: Normal range of motion.   Skin:     General: Skin is warm and dry.      Findings: Rash present.          Neurological:      General: No focal deficit present.      Mental Status: She is alert and oriented to person, place, and time.      Deep Tendon Reflexes: Reflexes are normal and symmetric.   Psychiatric:         Mood and Affect: Mood normal.         Behavior: Behavior normal.         Procedures           ED Course  ED Course as of Feb 05 0302 Fri Feb 05, 2021 0214 Patient has a few hives scattered on upper extremities, torso and face.  Excoriations on her back.  Patient cannot take most antihistamines secondary to allergic reaction.  Thus giving Solu-Medrol IM and Pepcid p.o.    [SS]   0252 Patient reports itching much improved.  Rash resolving.  Oral exam unremarkable.  Lungs again clear to auscultation bilaterally.  Discussed with patient need for evaluation by an allergist to determine the cause of tonight symptoms.  Prescribing steroids for home.  Will DC home.Prescriptions prednisone Dosepak    [SS]      ED Course User Index  [SS] Prakash Beck MD      My differential diagnosis for rash includes but is not limited to allergic reaction, hives, urticaria, anaphylactoid reaction, anaphylaxis, erythema multiforme, drug rash, contact dermatitis, soft tissue infection, cellulitis, abscess, impetigo, eczema, psoriasis, hidradenitis superlative,  meningococcemia, sepsis, toxic shock syndrome, [place] spotted fever, Lyme disease, disseminated gonococcemia, syphilis, scarlet fever, scarlatina, chickenpox, herpes zoster, viral exanthem, pityriasis rosea, scabies, bedbugs and allergic reaction.                                           MDM    Final diagnoses:   Allergic reaction, initial encounter            Prakash Beck MD  02/05/21 1282

## 2021-03-03 ENCOUNTER — HOSPITAL ENCOUNTER (EMERGENCY)
Facility: HOSPITAL | Age: 61
Discharge: HOME | End: 2021-03-03
Attending: EMERGENCY MEDICINE | Admitting: EMERGENCY MEDICINE

## 2021-03-03 VITALS
BODY MASS INDEX: 33.75 KG/M2 | DIASTOLIC BLOOD PRESSURE: 78 MMHG | HEART RATE: 94 BPM | HEIGHT: 66 IN | SYSTOLIC BLOOD PRESSURE: 120 MMHG | OXYGEN SATURATION: 98 % | RESPIRATION RATE: 18 BRPM | WEIGHT: 210 LBS | TEMPERATURE: 98.2 F

## 2021-03-03 DIAGNOSIS — L29.9 PRURITUS: Primary | ICD-10-CM

## 2021-03-03 PROCEDURE — 99282 EMERGENCY DEPT VISIT SF MDM: CPT

## 2021-03-03 NOTE — ED PROVIDER NOTES
EMERGENCY DEPARTMENT ENCOUNTER      Room Number: 08/08    History is provided by the patient, no translation services needed    HPI:    Chief complaint :Rash    Location: face, scalp, back    Quality/Severity:  itching    Timing/Duration: Today    Modifying Factors: Nothing makes it better or worse    Associated Symptoms: No fever no chills    Narrative: Pt is a 60 y.o. female who presents complaining of rash and itching times today.  Patient is at the areas on her face and her scalp and her back are with hives.  Patient denies any new soaps lotions or detergents.  Patient denies any new foods or medications.  Patient denies any difficulty breathing.  Patient denies any difficulty with swallowing.  Patient said that she is an appoint with her allergist in 2 days.      PMD: Johnie Wood MD    REVIEW OF SYSTEMS  Review of Systems   Constitutional: Negative for chills and fever.   Eyes: Negative for pain and visual disturbance.   Respiratory: Negative for cough and shortness of breath.    Cardiovascular: Negative for chest pain and leg swelling.   Gastrointestinal: Negative for abdominal pain, constipation, diarrhea, nausea and vomiting.   Genitourinary: Negative for dysuria and flank pain.   Musculoskeletal: Negative for arthralgias and myalgias.   Skin: Positive for color change and rash. Negative for wound.        Itching scalp, face, back   Neurological: Negative for dizziness, syncope and headaches.   Psychiatric/Behavioral: Negative for suicidal ideas. The patient is not nervous/anxious.          PAST MEDICAL HISTORY  Active Ambulatory Problems     Diagnosis Date Noted   • Soft tissue tumor 10/08/2018   • Abnormal EKG 12/16/2019   • Irregular heart beat 12/16/2019   • Primary osteoarthritis of knees, bilateral 08/26/2020     Resolved Ambulatory Problems     Diagnosis Date Noted   • No Resolved Ambulatory Problems     Past Medical History:   Diagnosis Date   • Bowel obstruction (CMS/HCC) 2016   •  Colon polyp    • Disease of thyroid gland    • Heart murmur    • Hyperlipidemia    • PONV (postoperative nausea and vomiting)        PAST SURGICAL HISTORY  Past Surgical History:   Procedure Laterality Date   • APPENDECTOMY     • CHEST WALL MASS EXCISION Right 10/17/2018    Procedure: EXCISION OF RIGHT CHEST WALL MASS;  Surgeon: Tim Palencia MD;  Location: [facility] OR;  Service: General   • COLON SURGERY  2016    states piece removed d/t obstruction/scar tissue   • COLONOSCOPY N/A 11/28/2016    Procedure: COLONOSCOPY with polypectomy;  Surgeon: Kristy Caballero MD;  Location: [facility] OR;  Service:    • OVARIAN CYST SURGERY      x2       FAMILY HISTORY  Family History   Problem Relation Age of Onset   • Stroke Mother    • Hypertension Mother    • Alzheimer's disease Mother    • Prostate cancer Father    • Heart disease Father         pacemaker   • Colon polyps Sister    • Hypertension Brother    • Prostate cancer Brother    • Malig Hyperthermia Neg Hx    • Breast cancer Neg Hx        SOCIAL HISTORY  Social History     Socioeconomic History   • Marital status:      Spouse name: Not on file   • Number of children: Not on file   • Years of education: Not on file   • Highest education level: Not on file   Tobacco Use   • Smoking status: Never Smoker   • Smokeless tobacco: Never Used   Substance and Sexual Activity   • Alcohol use: Yes     Comment: rarely   • Drug use: No   • Sexual activity: Defer       ALLERGIES  Antihistamines, diphenhydramine-type; Claritin [loratadine]; and Other    No current facility-administered medications for this encounter.     Current Outpatient Medications:   •  baclofen (LIORESAL) 10 MG tablet, Take 10 mg by mouth 3 (Three) Times a Day., Disp: , Rfl:   •  fluticasone (FLONASE) 50 MCG/ACT nasal spray, 2 sprays into the nostril(s) as directed by provider Daily., Disp: , Rfl:   •  lisinopril-hydrochlorothiazide (PRINZIDE,ZESTORETIC) 20-12.5 MG per tablet, Take 1 tablet by mouth Daily.,  Disp: , Rfl:   •  meloxicam (MOBIC) 15 MG tablet, Take 1 tablet by mouth Daily., Disp: 30 tablet, Rfl: 3  •  predniSONE (DELTASONE) 10 MG tablet, 6 tabs by mouth day 1, 5 tabs by mouth day 2, continue tapering one tablet daily: Coarse 6 days., Disp: 21 tablet, Rfl: 0  •  rosuvastatin (CRESTOR) 10 MG tablet, Take 10 mg by mouth Daily., Disp: , Rfl:   •  Synthroid 112 MCG tablet, Take 112 mcg by mouth Daily., Disp: , Rfl:   •  vitamin D (ERGOCALCIFEROL) 1.25 MG (57953 UT) capsule capsule, Take 50,000 Units by mouth 1 (One) Time Per Week., Disp: , Rfl:     PHYSICAL EXAM  ED Triage Vitals [03/03/21 1605]   Temp Heart Rate Resp BP SpO2   98.2 °F (36.8 °C) 94 18 120/78 98 %      Temp src Heart Rate Source Patient Position BP Location FiO2 (%)   Temporal Monitor Sitting Right arm --       Physical Exam   Constitutional: She is oriented to person, place, and time and well-developed, well-nourished, and in no distress.   HENT:   Head: Normocephalic and atraumatic.   Eyes: Conjunctivae and EOM are normal.   Neck: Normal range of motion. Neck supple.   Cardiovascular: Normal rate, regular rhythm and normal heart sounds.   Pulmonary/Chest: Effort normal and breath sounds normal. No respiratory distress.   Abdominal: Soft. Bowel sounds are normal. She exhibits no distension. There is no abdominal tenderness.   Musculoskeletal: Normal range of motion.         General: No edema.   Neurological: She is alert and oriented to person, place, and time. GCS score is 15.   Skin: Skin is warm and dry.   Psychiatric: Mood and affect normal.   Nursing note and vitals reviewed.        LAB RESULTS  Lab Results (last 24 hours)     ** No results found for the last 24 hours. **                RADIOLOGY  No Radiology Exams Resulted Within Past 24 Hours        PROCEDURES  Procedures      PROGRESS AND CONSULTS  ED Course as of Mar 03 1811   Wed Mar 03, 2021   1809 60-year-old female presents to the ED with complaints of itching and a rash times  today.  Patient states that she is unsure what caused the rash.  After history and physical exam, patient was noted to have no rash no erythema or ecchymosis.  Discussed with patient that I did not see anything.  Patient states that she was still itching.  But discussed with patient if she had any new soaps lotions or foods.  She said no.  Patient will be discharged home to follow-up with PCP and to use Benadryl as needed.  Discussed with patient that if her symptoms worsened or persisted that she should return to the ED immediately.  Patient expressed verbal understanding of plan of care.    [GT]      ED Course User Index  [GT] Justine Babin PA-C           MEDICAL DECISION MAKING    MDM       DIAGNOSIS  Final diagnoses:   Pruritus       Latest Documented Vital Signs:  As of 18:11 EST  BP- 120/78 HR- 94 Temp- 98.2 °F (36.8 °C) (Temporal) O2 sat- 98%    DISPOSITION  Discharged home        Discussed pertinent labs and imaging findings with the patient/family.  Patient/Family voiced understanding of need to follow-up for recheck, further testing as needed.  Return to the emergency Department warnings were given.         Medication List      No changes were made to your prescriptions during this visit.             Follow-up Information     Johnie Wood MD. Call in 1 day.    Specialty: Family Medicine  Why: To schedule a follow up appointment  Contact information:  [address]    [place] [address] [phone]                     Dictated utilizing Dragon dictation     Justine Babin PA-C  03/03/21 7174

## 2021-04-13 ENCOUNTER — TELEPHONE (OUTPATIENT)
Dept: GASTROENTEROLOGY | Facility: CLINIC | Age: 61
End: 2021-04-13

## 2021-04-20 ENCOUNTER — TRANSCRIBE ORDERS (OUTPATIENT)
Dept: ADMINISTRATIVE | Facility: HOSPITAL | Age: 61
End: 2021-04-20

## 2021-04-20 DIAGNOSIS — Z12.39 BREAST SCREENING: Primary | ICD-10-CM

## 2021-05-01 ENCOUNTER — HOSPITAL ENCOUNTER (EMERGENCY)
Facility: HOSPITAL | Age: 61
Discharge: HOME | End: 2021-05-01
Attending: EMERGENCY MEDICINE | Admitting: EMERGENCY MEDICINE

## 2021-05-01 VITALS
WEIGHT: 210 LBS | BODY MASS INDEX: 33.75 KG/M2 | DIASTOLIC BLOOD PRESSURE: 90 MMHG | OXYGEN SATURATION: 98 % | TEMPERATURE: 98.1 F | RESPIRATION RATE: 18 BRPM | HEIGHT: 66 IN | SYSTOLIC BLOOD PRESSURE: 141 MMHG | HEART RATE: 95 BPM

## 2021-05-01 DIAGNOSIS — L29.9 PRURITUS: Primary | ICD-10-CM

## 2021-05-01 PROCEDURE — 25010000002 METHYLPREDNISOLONE PER 125 MG: Performed by: PHYSICIAN ASSISTANT

## 2021-05-01 PROCEDURE — 99282 EMERGENCY DEPT VISIT SF MDM: CPT

## 2021-05-01 PROCEDURE — 96372 THER/PROPH/DIAG INJ SC/IM: CPT

## 2021-05-01 PROCEDURE — 99282 EMERGENCY DEPT VISIT SF MDM: CPT | Performed by: PHYSICIAN ASSISTANT

## 2021-05-01 RX ORDER — CETIRIZINE HYDROCHLORIDE 10 MG/1
10 TABLET ORAL 2 TIMES DAILY PRN
Status: ON HOLD | COMMUNITY
End: 2022-08-11

## 2021-05-01 RX ORDER — METHYLPREDNISOLONE SODIUM SUCCINATE 125 MG/2ML
80 INJECTION, POWDER, LYOPHILIZED, FOR SOLUTION INTRAMUSCULAR; INTRAVENOUS ONCE
Status: COMPLETED | OUTPATIENT
Start: 2021-05-01 | End: 2021-05-01

## 2021-05-01 RX ORDER — METHYLPREDNISOLONE 4 MG/1
TABLET ORAL
Qty: 21 TABLET | Refills: 0 | Status: SHIPPED | OUTPATIENT
Start: 2021-05-01 | End: 2021-05-01 | Stop reason: SDUPTHER

## 2021-05-01 RX ORDER — METHYLPREDNISOLONE 4 MG/1
TABLET ORAL
Qty: 21 TABLET | Refills: 0 | OUTPATIENT
Start: 2021-05-01 | End: 2021-08-27

## 2021-05-01 RX ADMIN — METHYLPREDNISOLONE SODIUM SUCCINATE 80 MG: 125 INJECTION, POWDER, FOR SOLUTION INTRAMUSCULAR; INTRAVENOUS at 19:31

## 2021-05-01 NOTE — ED NOTES
Pt presents with c/o bumpy, raised rash to her face, arms, stomach and legs for over a week. She saw her allergist and he put her on zyrtec 2 times a day. She says it is not helping. There is no discernable rash to be seen - no significant bumps, redness, or signs of otherwise irritated skin. She is A&Ox4, ambulates with a steady gait     Mile Newton RN  05/01/21 1941

## 2021-05-01 NOTE — ED PROVIDER NOTES
" EMERGENCY DEPARTMENT ENCOUNTER      Room Number: 10/10    History is provided by the patient, no translation services needed    HPI:    Chief complaint: Hives    Location: Arms, abdomen, legs    Quality/Severity:  Itchy    Timing/Duration: 1.5 weeks    Modifying Factors: Patient taking Zyrtec, takes Benadryl as needed.  Benadryl seems to make the rash worse.    Associated Symptoms: Positive for rash.    Narrative: Pt is a 60 y.o. female who presents complaining of hives to arms, abdomen and legs for the past week and a half.  Patient states she had a similar rash in March.  She states she has hives but they are not visible and are \" under the skin.\"  She recently had evaluation with her PCP and was sent to an allergist for further testing.  She reported allergies to Benadryl and Claritin but states she was retested at her allergist office and determined did not have any allergy to antihistamines.  She states however when she takes Benadryl for her itching it makes her itch more.  She is currently taking Zyrtec, and Flonase.  She states she was seen in February for similar symptoms as well and was given steroids which helped.  She was then seen again in March, but did not receive any steroids.      PMD: Johnie Wood MD    REVIEW OF SYSTEMS  Review of Systems   Constitutional: Negative for chills and fever.   Respiratory: Negative for cough and shortness of breath.    Cardiovascular: Negative for chest pain and palpitations.   Gastrointestinal: Negative for abdominal pain, nausea and vomiting.   Musculoskeletal: Negative for arthralgias and myalgias.   Skin: Positive for rash. Negative for wound.   Neurological: Negative for dizziness and syncope.   Psychiatric/Behavioral: Negative for confusion. The patient is not nervous/anxious.          PAST MEDICAL HISTORY  Active Ambulatory Problems     Diagnosis Date Noted   • Soft tissue tumor 10/08/2018   • Abnormal EKG 12/16/2019   • Irregular heart beat " 12/16/2019   • Primary osteoarthritis of knees, bilateral 08/26/2020     Resolved Ambulatory Problems     Diagnosis Date Noted   • No Resolved Ambulatory Problems     Past Medical History:   Diagnosis Date   • Bowel obstruction (CMS/HCC) 2016   • Colon polyp    • Disease of thyroid gland    • Heart murmur    • Hyperlipidemia    • PONV (postoperative nausea and vomiting)        PAST SURGICAL HISTORY  Past Surgical History:   Procedure Laterality Date   • APPENDECTOMY     • CHEST WALL MASS EXCISION Right 10/17/2018    Procedure: EXCISION OF RIGHT CHEST WALL MASS;  Surgeon: Tim Palencia MD;  Location: [facility] OR;  Service: General   • COLON SURGERY  2016    states piece removed d/t obstruction/scar tissue   • COLONOSCOPY N/A 11/28/2016    Procedure: COLONOSCOPY with polypectomy;  Surgeon: Kristy Caballero MD;  Location: [facility] OR;  Service:    • OVARIAN CYST SURGERY      x2       FAMILY HISTORY  Family History   Problem Relation Age of Onset   • Stroke Mother    • Hypertension Mother    • Alzheimer's disease Mother    • Prostate cancer Father    • Heart disease Father         pacemaker   • Colon polyps Sister    • Hypertension Brother    • Prostate cancer Brother    • Malig Hyperthermia Neg Hx    • Breast cancer Neg Hx        SOCIAL HISTORY  Social History     Socioeconomic History   • Marital status:      Spouse name: Not on file   • Number of children: Not on file   • Years of education: Not on file   • Highest education level: Not on file   Tobacco Use   • Smoking status: Never Smoker   • Smokeless tobacco: Never Used   Substance and Sexual Activity   • Alcohol use: Yes     Comment: rarely   • Drug use: No   • Sexual activity: Defer       ALLERGIES  Antihistamines, diphenhydramine-type; Claritin [loratadine]; and Other    No current facility-administered medications for this encounter.    Current Outpatient Medications:   •  cetirizine (zyrTEC) 10 MG tablet, Take 10 mg by mouth 2 (Two) Times a Day As Needed  for Allergies., Disp: , Rfl:   •  baclofen (LIORESAL) 10 MG tablet, Take 10 mg by mouth 3 (Three) Times a Day., Disp: , Rfl:   •  camphor-menthol (SARNA) 0.5-0.5 % lotion, Apply  topically to the appropriate area as directed As Needed for Itching., Disp: 222 mL, Rfl: 0  •  fluticasone (FLONASE) 50 MCG/ACT nasal spray, 2 sprays into the nostril(s) as directed by provider Daily., Disp: , Rfl:   •  lisinopril-hydrochlorothiazide (PRINZIDE,ZESTORETIC) 20-12.5 MG per tablet, Take 1 tablet by mouth Daily., Disp: , Rfl:   •  meloxicam (MOBIC) 15 MG tablet, Take 1 tablet by mouth Daily., Disp: 30 tablet, Rfl: 3  •  methylPREDNISolone (MEDROL) 4 MG dose pack, Take as directed on package instructions., Disp: 21 tablet, Rfl: 0  •  rosuvastatin (CRESTOR) 10 MG tablet, Take 10 mg by mouth Daily., Disp: , Rfl:   •  Synthroid 112 MCG tablet, Take 112 mcg by mouth Daily., Disp: , Rfl:   •  vitamin D (ERGOCALCIFEROL) 1.25 MG (68481 UT) capsule capsule, Take 50,000 Units by mouth 1 (One) Time Per Week., Disp: , Rfl:     PHYSICAL EXAM  ED Triage Vitals [05/01/21 1912]   Temp Heart Rate Resp BP SpO2   98.1 °F (36.7 °C) 95 18 141/90 98 %      Temp src Heart Rate Source Patient Position BP Location FiO2 (%)   Oral -- -- -- --       Physical Exam  Vitals and nursing note reviewed.   Constitutional:       Appearance: She is obese.   HENT:      Head: Normocephalic and atraumatic.      Mouth/Throat:      Pharynx: Oropharynx is clear. No pharyngeal swelling, oropharyngeal exudate or posterior oropharyngeal erythema.   Eyes:      Conjunctiva/sclera: Conjunctivae normal.      Pupils: Pupils are equal, round, and reactive to light.   Cardiovascular:      Rate and Rhythm: Normal rate and regular rhythm.      Pulses: Normal pulses.      Heart sounds: Normal heart sounds.   Pulmonary:      Effort: Pulmonary effort is normal.      Breath sounds: Normal breath sounds. No stridor. No wheezing.   Abdominal:      General: Bowel sounds are normal.       Palpations: Abdomen is soft.      Tenderness: There is no abdominal tenderness. There is no guarding.   Musculoskeletal:         General: Normal range of motion.      Cervical back: Normal range of motion and neck supple.   Skin:     General: Skin is warm and dry.      Capillary Refill: Capillary refill takes less than 2 seconds.      Findings: No erythema, lesion or rash.      Comments: No visible rash, hives, erythema, or excoriations present.   Neurological:      Mental Status: She is alert and oriented to person, place, and time.   Psychiatric:         Mood and Affect: Mood and affect normal.         Cognition and Memory: Memory normal.         Judgment: Judgment normal.           LAB RESULTS  Lab Results (last 24 hours)     ** No results found for the last 24 hours. **            I ordered the above labs and reviewed the results    RADIOLOGY  No Radiology Exams Resulted Within Past 24 Hours    I ordered the above radiologic testing and reviewed the results    PROCEDURES  Procedures      PROGRESS AND CONSULTS  ED Course as of May 01 2015   Sat May 01, 2021   1930 Patient does not have any visible rash on her body.  Oropharyngeal exam is normal as well.  Given IM Solu-Medrol, and Medrol Dosepak that she is to start tomorrow.  I have discussed diagnosis of pruritus, and I have also prescribed some Sarna antiitch lotion.  Recommended follow-up with PCP and allergist for further evaluation if not improving.  Discussed return to ER warnings.  Patient verbalizes understanding and is agreeable with discharge at this time.    [KS]      ED Course User Index  [KS] Ofelia Taylor PA-C           MEDICAL DECISION MAKING    MDM       DIAGNOSIS  Final diagnoses:   Pruritus       Latest Documented Vital Signs:  As of 20:15 EDT  BP- 141/90 HR- 95 Temp- 98.1 °F (36.7 °C) (Oral) O2 sat- 98%    DISPOSITION  Patient discharged home.    Discussed pertinent findings with the patient/family.  Patient/Family voiced understanding  of need to follow-up for recheck and further testing as needed.  Return to the Emergency Department warnings were given.         Medication List      New Prescriptions    camphor-menthol 0.5-0.5 % lotion  Commonly known as: SARNA  Apply  topically to the appropriate area as directed As Needed for Itching.     methylPREDNISolone 4 MG dose pack  Commonly known as: MEDROL  Take as directed on package instructions.        Stop    predniSONE 10 MG tablet  Commonly known as: DELTASONE           Where to Get Your Medications      These medications were sent to [facility] [place] - [address] - [phone]  - [phone]   [address] [facility] [place] [address]    Phone: [phone]   · camphor-menthol 0.5-0.5 % lotion  · methylPREDNISolone 4 MG dose pack             Follow-up Information     Johnie Wood MD. Call in 2 days.    Specialty: Family Medicine  Why: To schedule follow-up appointment  Contact information:  [address]    [place] [address] [phone]                     Dictated utilizing Dragon dictation     Ofelia Taylor PA-C  05/01/21 2015

## 2021-05-01 NOTE — DISCHARGE INSTRUCTIONS
Return to the emergency department with worsening symptoms, or as needed with emergent concerns.  Please start the steroid Dosepak tomorrow morning, and follow-up with PCP and allergist.

## 2021-05-24 ENCOUNTER — HOSPITAL ENCOUNTER (OUTPATIENT)
Dept: MAMMOGRAPHY | Facility: HOSPITAL | Age: 61
Discharge: HOME | End: 2021-05-24
Admitting: FAMILY MEDICINE

## 2021-05-24 DIAGNOSIS — Z12.39 BREAST SCREENING: ICD-10-CM

## 2021-05-24 PROCEDURE — 77063 BREAST TOMOSYNTHESIS BI: CPT

## 2021-05-24 PROCEDURE — 77067 SCR MAMMO BI INCL CAD: CPT

## 2021-06-04 ENCOUNTER — PREP FOR SURGERY (OUTPATIENT)
Dept: OTHER | Facility: HOSPITAL | Age: 61
End: 2021-06-04

## 2021-06-04 DIAGNOSIS — Z12.11 ENCOUNTER FOR SCREENING FOR MALIGNANT NEOPLASM OF COLON: Primary | ICD-10-CM

## 2021-06-04 DIAGNOSIS — Z86.010 PERSONAL HISTORY OF COLONIC POLYPS: ICD-10-CM

## 2021-06-04 DIAGNOSIS — Z83.71 FAMILY HISTORY OF COLONIC POLYPS: ICD-10-CM

## 2021-06-08 ENCOUNTER — HOSPITAL ENCOUNTER (OUTPATIENT)
Facility: HOSPITAL | Age: 61
Setting detail: HOSPITAL OUTPATIENT SURGERY
End: 2021-06-08
Attending: INTERNAL MEDICINE | Admitting: INTERNAL MEDICINE

## 2021-06-08 PROBLEM — Z83.71 FAMILY HISTORY OF COLONIC POLYPS: Status: ACTIVE | Noted: 2021-06-08

## 2021-06-08 PROBLEM — Z86.010 PERSONAL HISTORY OF COLONIC POLYPS: Status: ACTIVE | Noted: 2021-06-08

## 2021-06-08 PROBLEM — Z83.719 FAMILY HISTORY OF COLONIC POLYPS: Status: ACTIVE | Noted: 2021-06-08

## 2021-06-08 PROBLEM — Z12.11 ENCOUNTER FOR SCREENING FOR MALIGNANT NEOPLASM OF COLON: Status: ACTIVE | Noted: 2021-06-08

## 2021-06-08 NOTE — TELEPHONE ENCOUNTER
RETURN CALL FROM PATIENT.  SCHEDULED AT [place] ON 12/08/2021 AT 12:30PM - ARRIVE 11:30AM.  WILL MAIL INSTRUCTIONS.

## 2021-07-24 ENCOUNTER — HOSPITAL ENCOUNTER (EMERGENCY)
Facility: HOSPITAL | Age: 61
Discharge: HOME | End: 2021-07-24
Attending: EMERGENCY MEDICINE | Admitting: EMERGENCY MEDICINE

## 2021-07-24 VITALS
TEMPERATURE: 98.7 F | RESPIRATION RATE: 16 BRPM | BODY MASS INDEX: 32.78 KG/M2 | SYSTOLIC BLOOD PRESSURE: 111 MMHG | OXYGEN SATURATION: 98 % | HEIGHT: 66 IN | WEIGHT: 204 LBS | HEART RATE: 80 BPM | DIASTOLIC BLOOD PRESSURE: 79 MMHG

## 2021-07-24 DIAGNOSIS — J35.1 ENLARGED TONSILS: ICD-10-CM

## 2021-07-24 DIAGNOSIS — R09.82 POSTNASAL DRIP: Primary | ICD-10-CM

## 2021-07-24 LAB — S PYO AG THROAT QL: NEGATIVE

## 2021-07-24 PROCEDURE — 99282 EMERGENCY DEPT VISIT SF MDM: CPT | Performed by: EMERGENCY MEDICINE

## 2021-07-24 PROCEDURE — 87081 CULTURE SCREEN ONLY: CPT | Performed by: EMERGENCY MEDICINE

## 2021-07-24 PROCEDURE — 87880 STREP A ASSAY W/OPTIC: CPT | Performed by: EMERGENCY MEDICINE

## 2021-07-24 PROCEDURE — 99283 EMERGENCY DEPT VISIT LOW MDM: CPT

## 2021-07-24 NOTE — ED PROVIDER NOTES
"Subjective   History of Present Illness  History of Present Illness    Chief complaint: Throat feels swollen    Location: \"Glands in my throat\"    Quality/Severity: Moderate swelling    Timing/Duration: Today    Modifying Factors: None    Narrative: This patient presents for evaluation of some swelling and discomfort in her throat.  She says it feels like her glands are swollen and she was worried that her throat might \"close off.\"  She denies any fevers.  She denies any difficulties breathing.  She took 1 Benadryl tablet earlier but says that she cannot take antihistamines regularly because she is sensitive to them.    Associated Symptoms: As above    Review of Systems   Constitutional: Negative for activity change, diaphoresis and fever.   HENT: Positive for congestion, sinus pressure and sore throat. Negative for nosebleeds and voice change.    Respiratory: Negative for cough and shortness of breath.    Cardiovascular: Negative for chest pain.   Gastrointestinal: Negative for abdominal pain.   Skin: Negative for color change and rash.   Neurological: Negative for syncope and headaches.   All other systems reviewed and are negative.      Past Medical History:   Diagnosis Date   • Bowel obstruction (CMS/HCC) 2016    h/o   • Colon polyp    • Disease of thyroid gland     hypothyroidism   • Heart murmur    • Hyperlipidemia    • PONV (postoperative nausea and vomiting)     had after w/pain pill, not sure what type       Allergies   Allergen Reactions   • Antihistamines, Diphenhydramine-Type Hives   • Claritin [Loratadine] Hives   • Other Hives     Pt states all Antihistamines cause hives       Past Surgical History:   Procedure Laterality Date   • APPENDECTOMY     • CHEST WALL MASS EXCISION Right 10/17/2018    Procedure: EXCISION OF RIGHT CHEST WALL MASS;  Surgeon: Tim Palencia MD;  Location: [facility] OR;  Service: General   • COLON SURGERY  2016    states piece removed d/t obstruction/scar tissue   • COLONOSCOPY N/A " 11/28/2016    Procedure: COLONOSCOPY with polypectomy;  Surgeon: Kristy Caballero MD;  Location: [facility];  Service:    • OVARIAN CYST SURGERY      x2       Family History   Problem Relation Age of Onset   • Stroke Mother    • Hypertension Mother    • Alzheimer's disease Mother    • Prostate cancer Father    • Heart disease Father         pacemaker   • Colon polyps Sister    • Hypertension Brother    • Prostate cancer Brother    • Malig Hyperthermia Neg Hx    • Breast cancer Neg Hx        Social History     Socioeconomic History   • Marital status:      Spouse name: Not on file   • Number of children: Not on file   • Years of education: Not on file   • Highest education level: Not on file   Tobacco Use   • Smoking status: Never Smoker   • Smokeless tobacco: Never Used   Substance and Sexual Activity   • Alcohol use: Yes     Comment: rarely   • Drug use: No   • Sexual activity: Defer       ED Triage Vitals [07/24/21 0945]   Temp Heart Rate Resp BP SpO2   98.7 °F (37.1 °C) 80 16 111/79 98 %      Temp src Heart Rate Source Patient Position BP Location FiO2 (%)   Oral Monitor Sitting Right arm --     Objective   Physical Exam  Vitals and nursing note reviewed.   Constitutional:       Appearance: She is well-developed.   HENT:      Head: Normocephalic and atraumatic.      Right Ear: Tympanic membrane normal.      Left Ear: Tympanic membrane normal.      Mouth/Throat:      Mouth: Mucous membranes are moist.      Pharynx: Posterior oropharyngeal erythema present. No oropharyngeal exudate.      Comments: Moderate bilateral enlargement of the tonsillar glands noted.  Uvula is midline.  Mild erythematous base noted.  No exudative changes.  Eyes:      General:         Right eye: No discharge.         Left eye: No discharge.      Pupils: Pupils are equal, round, and reactive to light.   Cardiovascular:      Rate and Rhythm: Normal rate and regular rhythm.      Pulses: Normal pulses.   Pulmonary:      Effort: Pulmonary  effort is normal. No respiratory distress.      Breath sounds: Normal breath sounds. No stridor. No wheezing or rhonchi.   Musculoskeletal:         General: No deformity. Normal range of motion.      Cervical back: Normal range of motion and neck supple.   Skin:     General: Skin is warm and dry.      Findings: No erythema or rash.   Neurological:      General: No focal deficit present.      Mental Status: She is alert and oriented to person, place, and time.   Psychiatric:         Behavior: Behavior normal.         Thought Content: Thought content normal.         Judgment: Judgment normal.         Procedures           ED Course  ED Course as of Jul 24 1048   Sat Jul 24, 2021   1033 I reviewed the strep swab which is negative.  Overall, patient's appearance and physical exam features are nonworrisome.  I think she has some simple postnasal drip and congestion issues.  I advised her to continue with usual symptomatic management.  Also encouraged follow-up with her PCP.    [LAMAR]      ED Course User Index  [LAMAR] Jose Martinez MD                                           MDM  Number of Diagnoses or Management Options     Amount and/or Complexity of Data Reviewed  Clinical lab tests: reviewed and ordered        Final diagnoses:   Postnasal drip   Enlarged tonsils       ED Disposition  ED Disposition     ED Disposition Condition Comment    Discharge Stable           Johnie Wood MD  [address]    [facility] [address] [phone]    Schedule an appointment as soon as possible for a visit in 3 days  As needed, If symptoms worsen         Medication List      No changes were made to your prescriptions during this visit.          Jose Martinez MD  07/24/21 8882

## 2021-07-26 LAB — BACTERIA SPEC AEROBE CULT: NORMAL

## 2021-08-27 ENCOUNTER — APPOINTMENT (OUTPATIENT)
Dept: GENERAL RADIOLOGY | Facility: HOSPITAL | Age: 61
End: 2021-08-27

## 2021-08-27 ENCOUNTER — HOSPITAL ENCOUNTER (EMERGENCY)
Facility: HOSPITAL | Age: 61
Discharge: HOME | End: 2021-08-27
Attending: EMERGENCY MEDICINE | Admitting: EMERGENCY MEDICINE

## 2021-08-27 VITALS
BODY MASS INDEX: 32.78 KG/M2 | TEMPERATURE: 98.3 F | SYSTOLIC BLOOD PRESSURE: 109 MMHG | RESPIRATION RATE: 14 BRPM | OXYGEN SATURATION: 100 % | WEIGHT: 204 LBS | DIASTOLIC BLOOD PRESSURE: 71 MMHG | HEART RATE: 74 BPM | HEIGHT: 66 IN

## 2021-08-27 DIAGNOSIS — R55 NEAR SYNCOPE: Primary | ICD-10-CM

## 2021-08-27 LAB
ALBUMIN SERPL-MCNC: 4.3 G/DL (ref 3.5–5.2)
ALBUMIN/GLOB SERPL: 1.3 G/DL
ALP SERPL-CCNC: 117 U/L (ref 39–117)
ALT SERPL W P-5'-P-CCNC: 22 U/L (ref 1–33)
ANION GAP SERPL CALCULATED.3IONS-SCNC: 7.6 MMOL/L (ref 5–15)
APTT PPP: 39 SECONDS (ref 24.3–38.1)
AST SERPL-CCNC: 22 U/L (ref 1–32)
BASOPHILS # BLD AUTO: 0.05 10*3/MM3 (ref 0–0.2)
BASOPHILS NFR BLD AUTO: 0.7 % (ref 0–1.5)
BILIRUB SERPL-MCNC: 0.2 MG/DL (ref 0–1.2)
BILIRUB UR QL STRIP: NEGATIVE
BUN SERPL-MCNC: 23 MG/DL (ref 8–23)
BUN/CREAT SERPL: 16.2 (ref 7–25)
CALCIUM SPEC-SCNC: 9.7 MG/DL (ref 8.6–10.5)
CHLORIDE SERPL-SCNC: 98 MMOL/L (ref 98–107)
CLARITY UR: CLEAR
CO2 SERPL-SCNC: 31.4 MMOL/L (ref 22–29)
COLOR UR: YELLOW
CREAT SERPL-MCNC: 1.42 MG/DL (ref 0.57–1)
DEPRECATED RDW RBC AUTO: 48.7 FL (ref 37–54)
EOSINOPHIL # BLD AUTO: 0.27 10*3/MM3 (ref 0–0.4)
EOSINOPHIL NFR BLD AUTO: 3.7 % (ref 0.3–6.2)
ERYTHROCYTE [DISTWIDTH] IN BLOOD BY AUTOMATED COUNT: 15.9 % (ref 12.3–15.4)
GFR SERPL CREATININE-BSD FRML MDRD: 45 ML/MIN/1.73
GLOBULIN UR ELPH-MCNC: 3.4 GM/DL
GLUCOSE SERPL-MCNC: 121 MG/DL (ref 65–99)
GLUCOSE UR STRIP-MCNC: NEGATIVE MG/DL
HCT VFR BLD AUTO: 40.8 % (ref 34–46.6)
HGB BLD-MCNC: 12.9 G/DL (ref 12–15.9)
HGB UR QL STRIP.AUTO: NEGATIVE
IMM GRANULOCYTES # BLD AUTO: 0.02 10*3/MM3 (ref 0–0.05)
IMM GRANULOCYTES NFR BLD AUTO: 0.3 % (ref 0–0.5)
INR PPP: 0.95 (ref 0.9–1.1)
KETONES UR QL STRIP: NEGATIVE
LEUKOCYTE ESTERASE UR QL STRIP.AUTO: NEGATIVE
LYMPHOCYTES # BLD AUTO: 2.66 10*3/MM3 (ref 0.7–3.1)
LYMPHOCYTES NFR BLD AUTO: 36 % (ref 19.6–45.3)
MCH RBC QN AUTO: 26.4 PG (ref 26.6–33)
MCHC RBC AUTO-ENTMCNC: 31.6 G/DL (ref 31.5–35.7)
MCV RBC AUTO: 83.6 FL (ref 79–97)
MONOCYTES # BLD AUTO: 0.7 10*3/MM3 (ref 0.1–0.9)
MONOCYTES NFR BLD AUTO: 9.5 % (ref 5–12)
NEUTROPHILS NFR BLD AUTO: 3.69 10*3/MM3 (ref 1.7–7)
NEUTROPHILS NFR BLD AUTO: 49.8 % (ref 42.7–76)
NITRITE UR QL STRIP: NEGATIVE
NRBC BLD AUTO-RTO: 0 /100 WBC (ref 0–0.2)
PH UR STRIP.AUTO: 5.5 [PH] (ref 4.5–8)
PLATELET # BLD AUTO: 288 10*3/MM3 (ref 140–450)
PMV BLD AUTO: 10 FL (ref 6–12)
POTASSIUM SERPL-SCNC: 4 MMOL/L (ref 3.5–5.2)
PROT SERPL-MCNC: 7.7 G/DL (ref 6–8.5)
PROT UR QL STRIP: NEGATIVE
PROTHROMBIN TIME: 12.7 SECONDS (ref 12.1–15)
RBC # BLD AUTO: 4.88 10*6/MM3 (ref 3.77–5.28)
SODIUM SERPL-SCNC: 137 MMOL/L (ref 136–145)
SP GR UR STRIP: 1.02 (ref 1–1.03)
TROPONIN T SERPL-MCNC: <0.01 NG/ML (ref 0–0.03)
UROBILINOGEN UR QL STRIP: NORMAL
WBC # BLD AUTO: 7.39 10*3/MM3 (ref 3.4–10.8)

## 2021-08-27 PROCEDURE — 71046 X-RAY EXAM CHEST 2 VIEWS: CPT

## 2021-08-27 PROCEDURE — 85025 COMPLETE CBC W/AUTO DIFF WBC: CPT | Performed by: EMERGENCY MEDICINE

## 2021-08-27 PROCEDURE — 85610 PROTHROMBIN TIME: CPT | Performed by: EMERGENCY MEDICINE

## 2021-08-27 PROCEDURE — 99283 EMERGENCY DEPT VISIT LOW MDM: CPT | Performed by: EMERGENCY MEDICINE

## 2021-08-27 PROCEDURE — 85730 THROMBOPLASTIN TIME PARTIAL: CPT | Performed by: EMERGENCY MEDICINE

## 2021-08-27 PROCEDURE — 93005 ELECTROCARDIOGRAM TRACING: CPT | Performed by: EMERGENCY MEDICINE

## 2021-08-27 PROCEDURE — 93010 ELECTROCARDIOGRAM REPORT: CPT | Performed by: INTERNAL MEDICINE

## 2021-08-27 PROCEDURE — 99283 EMERGENCY DEPT VISIT LOW MDM: CPT

## 2021-08-27 PROCEDURE — 80053 COMPREHEN METABOLIC PANEL: CPT | Performed by: EMERGENCY MEDICINE

## 2021-08-27 PROCEDURE — 81003 URINALYSIS AUTO W/O SCOPE: CPT | Performed by: EMERGENCY MEDICINE

## 2021-08-27 PROCEDURE — 84484 ASSAY OF TROPONIN QUANT: CPT | Performed by: EMERGENCY MEDICINE

## 2021-08-27 RX ORDER — SODIUM CHLORIDE 0.9 % (FLUSH) 0.9 %
10 SYRINGE (ML) INJECTION AS NEEDED
Status: DISCONTINUED | OUTPATIENT
Start: 2021-08-27 | End: 2021-08-28 | Stop reason: HOSPADM

## 2021-08-27 RX ADMIN — SODIUM CHLORIDE, POTASSIUM CHLORIDE, SODIUM LACTATE AND CALCIUM CHLORIDE 1000 ML: 600; 310; 30; 20 INJECTION, SOLUTION INTRAVENOUS at 21:35

## 2021-08-28 LAB — QT INTERVAL: 368 MS

## 2021-08-28 NOTE — ED PROVIDER NOTES
EMERGENCY DEPARTMENT ENCOUNTER      Room Number: 10/10      HPI:    Chief complaint: Chest discomfort and near syncope    Location: Central chest discomfort    Quality/Severity: Described as a tightness    Timing/Duration: Symptoms started this morning    Modifying Factors: Near syncope occurs when standing    Associated Symptoms: None    Narrative: Pt is a 60 y.o. female who presents complaining of chest discomfort and near syncope as noted above.  The patient relates that after breakfast this morning she began to notice some mild chest discomfort.  This was described as a tightness and did seem to be intermittent.  The patient did have an episode where she was standing at her kitchen sink when she felt lightheaded and had to sit down until the symptoms passed.  Patient did check her blood pressure and found it to be 78/54.  Patient later rechecked her blood pressure and the systolic was over 100.      PMD: Johnie Wood MD    REVIEW OF SYSTEMS  Review of Systems   Constitutional: Negative for activity change, appetite change, fatigue and fever.   HENT: Positive for sinus pressure (Recently treated for sinusitis) and sinus pain. Negative for congestion.    Respiratory: Negative for cough, shortness of breath and wheezing.    Cardiovascular: Positive for chest pain and leg swelling (Occasional and mild). Negative for palpitations.   Gastrointestinal: Negative for abdominal pain, diarrhea, nausea and vomiting.   Endocrine: Negative for polydipsia.   Genitourinary: Negative for difficulty urinating, dysuria, flank pain, frequency and urgency.   Musculoskeletal: Negative for back pain.   Skin: Negative for rash.   Neurological: Positive for light-headedness. Negative for dizziness, weakness and headaches.   Psychiatric/Behavioral: Negative for confusion.   All other systems reviewed and are negative.      PAST MEDICAL HISTORY  Active Ambulatory Problems     Diagnosis Date Noted   • Soft tissue tumor  10/08/2018   • Abnormal EKG 12/16/2019   • Irregular heart beat 12/16/2019   • Primary osteoarthritis of knees, bilateral 08/26/2020   • Personal history of colonic polyps 06/08/2021   • Family history of colonic polyps 06/08/2021   • Encounter for screening for malignant neoplasm of colon 06/08/2021     Resolved Ambulatory Problems     Diagnosis Date Noted   • No Resolved Ambulatory Problems     Past Medical History:   Diagnosis Date   • Bowel obstruction (CMS/HCC) 2016   • Colon polyp    • Disease of thyroid gland    • Heart murmur    • Hyperlipidemia    • PONV (postoperative nausea and vomiting)        PAST SURGICAL HISTORY  Past Surgical History:   Procedure Laterality Date   • APPENDECTOMY     • CHEST WALL MASS EXCISION Right 10/17/2018    Procedure: EXCISION OF RIGHT CHEST WALL MASS;  Surgeon: Tim Palencia MD;  Location: [facility] OR;  Service: General   • COLON SURGERY  2016    states piece removed d/t obstruction/scar tissue   • COLONOSCOPY N/A 11/28/2016    Procedure: COLONOSCOPY with polypectomy;  Surgeon: Kristy Caballero MD;  Location: [facility] OR;  Service:    • OVARIAN CYST SURGERY      x2       FAMILY HISTORY  Family History   Problem Relation Age of Onset   • Stroke Mother    • Hypertension Mother    • Alzheimer's disease Mother    • Prostate cancer Father    • Heart disease Father         pacemaker   • Colon polyps Sister    • Hypertension Brother    • Prostate cancer Brother    • Malig Hyperthermia Neg Hx    • Breast cancer Neg Hx        SOCIAL HISTORY  Social History     Socioeconomic History   • Marital status:      Spouse name: Not on file   • Number of children: Not on file   • Years of education: Not on file   • Highest education level: Not on file   Tobacco Use   • Smoking status: Never Smoker   • Smokeless tobacco: Never Used   Substance and Sexual Activity   • Alcohol use: Yes     Comment: rarely   • Drug use: No   • Sexual activity: Defer       ALLERGIES  Antihistamines,  diphenhydramine-type; Claritin [loratadine]; and Other    PHYSICAL EXAM  ED Triage Vitals [08/27/21 1926]   Temp Heart Rate Resp BP SpO2   98.3 °F (36.8 °C) 83 14 154/96 100 %      Temp src Heart Rate Source Patient Position BP Location FiO2 (%)   Oral Monitor Lying Right arm --       Physical Exam  Vitals and nursing note reviewed.   Constitutional:       Appearance: Normal appearance.   HENT:      Head: Normocephalic and atraumatic.   Eyes:      Extraocular Movements: Extraocular movements intact.      Conjunctiva/sclera: Conjunctivae normal.   Neck:      Thyroid: No thyromegaly.   Cardiovascular:      Rate and Rhythm: Normal rate and regular rhythm.      Heart sounds: Normal heart sounds. No murmur heard.     Pulmonary:      Effort: Pulmonary effort is normal. No respiratory distress.      Breath sounds: Normal breath sounds.   Abdominal:      General: Bowel sounds are normal.      Palpations: Abdomen is soft.      Tenderness: There is no abdominal tenderness.   Musculoskeletal:         General: Normal range of motion.      Cervical back: Normal range of motion and neck supple.      Right lower leg: No edema.      Left lower leg: No edema.   Lymphadenopathy:      Cervical: No cervical adenopathy.   Skin:     General: Skin is warm and dry.   Neurological:      General: No focal deficit present.      Mental Status: She is alert and oriented to person, place, and time.   Psychiatric:         Mood and Affect: Affect normal.         LAB RESULTS  Results for orders placed or performed during the hospital encounter of 08/27/21   Comprehensive Metabolic Panel    Specimen: Blood   Result Value Ref Range    Glucose 121 (H) 65 - 99 mg/dL    BUN 23 8 - 23 mg/dL    Creatinine 1.42 (H) 0.57 - 1.00 mg/dL    Sodium 137 136 - 145 mmol/L    Potassium 4.0 3.5 - 5.2 mmol/L    Chloride 98 98 - 107 mmol/L    CO2 31.4 (H) 22.0 - 29.0 mmol/L    Calcium 9.7 8.6 - 10.5 mg/dL    Total Protein 7.7 6.0 - 8.5 g/dL    Albumin 4.30 3.50 - 5.20  g/dL    ALT (SGPT) 22 1 - 33 U/L    AST (SGOT) 22 1 - 32 U/L    Alkaline Phosphatase 117 39 - 117 U/L    Total Bilirubin 0.2 0.0 - 1.2 mg/dL    eGFR  African Amer 45 (L) >60 mL/min/1.73    Globulin 3.4 gm/dL    A/G Ratio 1.3 g/dL    BUN/Creatinine Ratio 16.2 7.0 - 25.0    Anion Gap 7.6 5.0 - 15.0 mmol/L   Protime-INR    Specimen: Blood   Result Value Ref Range    Protime 12.7 12.1 - 15.0 Seconds    INR 0.95 0.90 - 1.10   aPTT    Specimen: Blood   Result Value Ref Range    PTT 39.0 (H) 24.3 - 38.1 seconds   Urinalysis With Microscopic If Indicated (No Culture) - Urine, Clean Catch    Specimen: Urine, Clean Catch   Result Value Ref Range    Color, UA Yellow Yellow, Straw    Appearance, UA Clear Clear    pH, UA 5.5 4.5 - 8.0    Specific Gravity, UA 1.020 1.003 - 1.030    Glucose, UA Negative Negative    Ketones, UA Negative Negative    Bilirubin, UA Negative Negative    Blood, UA Negative Negative    Protein, UA Negative Negative    Leuk Esterase, UA Negative Negative    Nitrite, UA Negative Negative    Urobilinogen, UA 0.2 E.U./dL 0.2 - 1.0 E.U./dL   Troponin    Specimen: Blood   Result Value Ref Range    Troponin T <0.010 0.000 - 0.030 ng/mL   CBC Auto Differential    Specimen: Blood   Result Value Ref Range    WBC 7.39 3.40 - 10.80 10*3/mm3    RBC 4.88 3.77 - 5.28 10*6/mm3    Hemoglobin 12.9 12.0 - 15.9 g/dL    Hematocrit 40.8 34.0 - 46.6 %    MCV 83.6 79.0 - 97.0 fL    MCH 26.4 (L) 26.6 - 33.0 pg    MCHC 31.6 31.5 - 35.7 g/dL    RDW 15.9 (H) 12.3 - 15.4 %    RDW-SD 48.7 37.0 - 54.0 fl    MPV 10.0 6.0 - 12.0 fL    Platelets 288 140 - 450 10*3/mm3    Neutrophil % 49.8 42.7 - 76.0 %    Lymphocyte % 36.0 19.6 - 45.3 %    Monocyte % 9.5 5.0 - 12.0 %    Eosinophil % 3.7 0.3 - 6.2 %    Basophil % 0.7 0.0 - 1.5 %    Immature Grans % 0.3 0.0 - 0.5 %    Neutrophils, Absolute 3.69 1.70 - 7.00 10*3/mm3    Lymphocytes, Absolute 2.66 0.70 - 3.10 10*3/mm3    Monocytes, Absolute 0.70 0.10 - 0.90 10*3/mm3    Eosinophils, Absolute  0.27 0.00 - 0.40 10*3/mm3    Basophils, Absolute 0.05 0.00 - 0.20 10*3/mm3    Immature Grans, Absolute 0.02 0.00 - 0.05 10*3/mm3    nRBC 0.0 0.0 - 0.2 /100 WBC   ECG 12 Lead   Result Value Ref Range    QT Interval 368 ms         I ordered the above labs and reviewed the results    RADIOLOGY  XR Chest 2 View    Result Date: 8/27/2021  Narrative: CR Chest 2 Vws INDICATION:  Near syncope, chest pain COMPARISON:  Chest x-ray from 7/7/2019 FINDINGS: PA and lateral views of the chest.  Heart and mediastinal contours are normal. The lungs are clear. No pneumothorax or pleural effusion.      Impression: No acute cardiopulmonary findings. Signer Name: Emmanuelle Davis MD  Signed: 8/27/2021 8:16 PM  Workstation Name: [id number]  [facility]      I ordered the above radiologic testing and reviewed the results    PROCEDURES  Procedures      PROGRESS AND CONSULTS  ED Course as of Aug 27 2314   Fri Aug 27, 2021   1945 EKG tracing was contemporaneously reviewed at 1920 hrs. at the patient's bedside.  Normal sinus rhythm with a rate of 79 bpm.  P waves, QRS complexes, ST segments and T waves all unremarkable.  No ectopy.  Normal ECG.    [ML]   2126 Orthostatics noted and IVF ordered    [ML]   2219 Patient advised to increase fluid intake over the next 24 hours and to discontinue her antihypertensive medication until she can see Dr. Wood in 4-5 days.    [ML]   2314 Dr. Wood was spoken to concerning this patient of his and did agree to follow-up next week concerning the patient's blood pressure and antihypertensives.    [ML]      ED Course User Index  [ML] Néstor Cam MD           MEDICAL DECISION MAKING  Results were reviewed/discussed with the patient and they were also made aware of online access. Pt also made aware that some labs, such as cultures, will not be resulted during ER visit and follow up with PMD is necessary.     MDM       DIAGNOSIS  Final diagnoses:   Near syncope        Latest Documented Vital Signs:  As of 23:14 EDT  BP- 109/71 HR- 74 Temp- 98.3 °F (36.8 °C) (Oral) O2 sat- 100%    DISPOSITION  Discharged in good condition       Medication List      Stop    lisinopril-hydrochlorothiazide 20-12.5 MG per tablet  Commonly known as: PRINZIDE,ZESTORETIC     methylPREDNISolone 4 MG dose pack  Commonly known as: MEDROL            Follow-up Information     Johnie Wood MD.    Specialty: Family Medicine  Contact information:  [address]     [place] [address] [phone]                      Néstor Cam MD  08/27/21 9411

## 2021-08-28 NOTE — DISCHARGE INSTRUCTIONS
Drink extra fluids over the next 24 hours and discontinue your high blood pressure medication (lisinopril/HCTZ) until you can see Dr. Wood in 5 days

## 2021-11-12 ENCOUNTER — HOSPITAL ENCOUNTER (EMERGENCY)
Facility: HOSPITAL | Age: 61
Discharge: HOME | End: 2021-11-12
Attending: EMERGENCY MEDICINE | Admitting: EMERGENCY MEDICINE

## 2021-11-12 VITALS
RESPIRATION RATE: 16 BRPM | WEIGHT: 205 LBS | HEART RATE: 75 BPM | OXYGEN SATURATION: 100 % | TEMPERATURE: 98 F | BODY MASS INDEX: 32.95 KG/M2 | SYSTOLIC BLOOD PRESSURE: 155 MMHG | HEIGHT: 66 IN | DIASTOLIC BLOOD PRESSURE: 98 MMHG

## 2021-11-12 DIAGNOSIS — J35.1 ENLARGED TONSILS: Primary | ICD-10-CM

## 2021-11-12 DIAGNOSIS — R09.82 POSTNASAL DRIP: ICD-10-CM

## 2021-11-12 LAB
HETEROPH AB SER QL LA: NEGATIVE
S PYO AG THROAT QL: NEGATIVE

## 2021-11-12 PROCEDURE — 87880 STREP A ASSAY W/OPTIC: CPT | Performed by: EMERGENCY MEDICINE

## 2021-11-12 PROCEDURE — 86308 HETEROPHILE ANTIBODY SCREEN: CPT | Performed by: EMERGENCY MEDICINE

## 2021-11-12 PROCEDURE — 87081 CULTURE SCREEN ONLY: CPT | Performed by: EMERGENCY MEDICINE

## 2021-11-12 PROCEDURE — 99283 EMERGENCY DEPT VISIT LOW MDM: CPT

## 2021-11-12 PROCEDURE — 36415 COLL VENOUS BLD VENIPUNCTURE: CPT

## 2021-11-12 PROCEDURE — 99283 EMERGENCY DEPT VISIT LOW MDM: CPT | Performed by: EMERGENCY MEDICINE

## 2021-11-12 RX ORDER — METHYLPREDNISOLONE 4 MG/1
TABLET ORAL
Qty: 21 TABLET | Refills: 0 | OUTPATIENT
Start: 2021-11-12 | End: 2021-11-21

## 2021-11-12 NOTE — ED PROVIDER NOTES
"Subjective     History provided by:  Patient and medical records    History of Present Illness    · Chief complaint: \"Tonsils are swollen\"    · Location: Both tonsils.    · Quality/Severity: The patient states her tonsils feel swollen and looks swollen.  She reports very little discomfort in her tonsils, more so on the right than the left.    · Timing/Onset: Been present for 2 weeks.    · Modifying Factors: She has a history of previously being seen for enlarged tonsils last July.  She has no previous history of strep pharyngitis.    · Associated symptoms: She denies any difficulty swallowing or breathing.  She denies a fever or chills.  She does report sinus pressure in her forehead and the top of her nose with a postnasal drip.  She denies a cough.  Reports being a little hoarse.    · Narrative: The patient is a 61-year-old -American female complaining of a 2-week history of her tonsils feeling swollen.  She saw Chente, the midlevel at her PCP Dr. Wood office 2 weeks ago and states he told her her tonsils looked a little enlarged, but not acutely inflamed.  She states her tonsils feel significantly more swollen this past week and \"are almost touching\".  She has no previous history of strep throat.  She is not a diabetic.  She also reports some sinus pressure in her forehead and the bridge of her nose with postnasal drip.  She also reports being a little hoarse.  The patient states she is concerned that her tonsils \"might close up\".    Review of Systems   Constitutional: Negative for activity change, appetite change, chills, diaphoresis, fatigue and fever.   HENT: Positive for postnasal drip, sinus pressure, sore throat ( Very slight) and voice change ( \"A little hoarse \".). Negative for congestion, dental problem, ear pain, hearing loss, mouth sores, rhinorrhea, sinus pain, tinnitus and trouble swallowing.    Eyes: Negative for photophobia, pain, discharge, redness and visual disturbance. " "  Respiratory: Negative for cough, chest tightness, shortness of breath, wheezing and stridor.    Cardiovascular: Negative for chest pain, palpitations and leg swelling.   Gastrointestinal: Negative for abdominal pain, diarrhea, nausea and vomiting.   Genitourinary: Negative for difficulty urinating, dysuria, flank pain, frequency, hematuria and urgency.   Musculoskeletal: Negative for arthralgias, back pain, gait problem, joint swelling, myalgias, neck pain and neck stiffness.   Skin: Negative for color change and rash.   Neurological: Negative for dizziness, tremors, seizures, syncope, facial asymmetry, speech difficulty, weakness, light-headedness, numbness and headaches.   Hematological: Negative for adenopathy.   Psychiatric/Behavioral: Negative.  Negative for confusion and decreased concentration. The patient is not nervous/anxious.      Past Medical History:   Diagnosis Date   • Bowel obstruction (HCC) 2016    h/o   • Colon polyp    • Disease of thyroid gland     hypothyroidism   • Heart murmur    • Hyperlipidemia    • PONV (postoperative nausea and vomiting)     had after w/pain pill, not sure what type     /98   Pulse 75   Temp 98 °F (36.7 °C) (Oral)   Resp 16   Ht 167.6 cm (66\")   Wt 93 kg (205 lb)   SpO2 100%   BMI 33.09 kg/m²     Past Medical History:   Diagnosis Date   • Bowel obstruction (HCC) 2016    h/o   • Colon polyp    • Disease of thyroid gland     hypothyroidism   • Heart murmur    • Hyperlipidemia    • PONV (postoperative nausea and vomiting)     had after w/pain pill, not sure what type       Allergies   Allergen Reactions   • Antihistamines, Diphenhydramine-Type Hives   • Claritin [Loratadine] Hives   • Other Hives     Pt states all Antihistamines cause hives       Past Surgical History:   Procedure Laterality Date   • APPENDECTOMY     • CHEST WALL MASS EXCISION Right 10/17/2018    Procedure: EXCISION OF RIGHT CHEST WALL MASS;  Surgeon: Tim Palencia MD;  Location:  [facility] OR;  " Service: General   • COLON SURGERY  2016    states piece removed d/t obstruction/scar tissue   • COLONOSCOPY N/A 11/28/2016    Procedure: COLONOSCOPY with polypectomy;  Surgeon: Kristy Caballero MD;  Location: [facility] OR;  Service:    • OVARIAN CYST SURGERY      x2       Family History   Problem Relation Age of Onset   • Stroke Mother    • Hypertension Mother    • Alzheimer's disease Mother    • Prostate cancer Father    • Heart disease Father         pacemaker   • Colon polyps Sister    • Hypertension Brother    • Prostate cancer Brother    • Malig Hyperthermia Neg Hx    • Breast cancer Neg Hx        Social History     Socioeconomic History   • Marital status:    Tobacco Use   • Smoking status: Never Smoker   • Smokeless tobacco: Never Used   Substance and Sexual Activity   • Alcohol use: Not Currently     Comment: rarely   • Drug use: No   • Sexual activity: Defer           Objective   Physical Exam  Vitals and nursing note reviewed.   Constitutional:       General: She is not in acute distress.     Appearance: Normal appearance. She is well-developed and normal weight. She is not ill-appearing, toxic-appearing or diaphoretic.      Comments: The patient appears healthy in no acute distress.  Review of her vital signs: She is afebrile with a temperature of 98, respirations normal 16 with a normal room air oxygen saturation of 99%, heart rates normal 73, blood pressure elevated 160/93.   HENT:      Head: Normocephalic and atraumatic.      Nose: Nose normal.      Mouth/Throat:      Mouth: Mucous membranes are moist.      Pharynx: Oropharynx is clear. No oropharyngeal exudate or posterior oropharyngeal erythema.      Comments: The patient's tonsils are not particularly enlarged.  There is no erythema.  There is some mild edema of the uvula which is not significantly enlarged.  Eyes:      General: No scleral icterus.        Right eye: No discharge.         Left eye: No discharge.      Conjunctiva/sclera:  Conjunctivae normal.      Pupils: Pupils are equal, round, and reactive to light.   Neck:      Thyroid: No thyromegaly.      Vascular: No JVD.   Cardiovascular:      Rate and Rhythm: Normal rate and regular rhythm.      Heart sounds: Normal heart sounds. No murmur heard.      Pulmonary:      Effort: Pulmonary effort is normal.      Breath sounds: Normal breath sounds. No wheezing, rhonchi or rales.   Chest:      Chest wall: No tenderness.   Abdominal:      General: Bowel sounds are normal. There is no distension.      Palpations: Abdomen is soft.      Tenderness: There is no abdominal tenderness.   Musculoskeletal:         General: No tenderness or deformity. Normal range of motion.      Cervical back: Normal range of motion and neck supple. No tenderness.   Lymphadenopathy:      Cervical: No cervical adenopathy.   Skin:     General: Skin is warm and dry.      Findings: No rash.   Neurological:      General: No focal deficit present.      Mental Status: She is alert and oriented to person, place, and time.      Cranial Nerves: No cranial nerve deficit.      Coordination: Coordination normal.      Comments: No focal motor sensory deficit   Psychiatric:         Mood and Affect: Mood normal.         Behavior: Behavior normal.         Thought Content: Thought content normal.         Judgment: Judgment normal.         Procedures           ED Course  ED Course as of 11/12/21 1513   Fri Nov 12, 2021   1104 Patient's Monospot and rapid strep screen were negative. [TP]   1105 Is my impression the patient likely has a little viral pharyngitis causing her uvula and tonsils to be a little edematous. I'll place her on a Medrol Dosepak to see if that helps. She is to follow-up with her PCP Dr. Wood in a week. [TP]      ED Course User Index  [TP] Tim Armenta MD                                           MDM  Number of Diagnoses or Management Options  Enlarged tonsils: new and requires workup  Postnasal drip: new and  requires workup     Amount and/or Complexity of Data Reviewed  Clinical lab tests: ordered and reviewed    Risk of Complications, Morbidity, and/or Mortality  Presenting problems: moderate  Diagnostic procedures: moderate  Management options: moderate    Patient Progress  Patient progress: stable      Final diagnoses:   Enlarged tonsils   Postnasal drip       ED Disposition  ED Disposition     ED Disposition Condition Comment    Discharge Stable           Johnie Wood MD  [address]    [place] [address] [phone]    Schedule an appointment as soon as possible for a visit in 1 week           Medication List      New Prescriptions    methylPREDNISolone 4 MG dose pack  Commonly known as: MEDROL  follow package directions           Where to Get Your Medications      These medications were sent to [facility] [address] [place] [address] [place], [place] [address]  [phone] PH - [phone] FX  [address] [facility] [address] [place] [address]    Phone: [phone]   · methylPREDNISolone 4 MG dose pack         Labs Reviewed   RAPID STREP A SCREEN - Normal   MONONUCLEOSIS SCREEN - Normal   BETA HEMOLYTIC STREP CULTURE, THROAT     No orders to display          Medication List      New Prescriptions    methylPREDNISolone 4 MG dose pack  Commonly known as: MEDROL  follow package directions           Where to Get Your Medications      These medications were sent to [facility] [address] [place] [address] [place] - [address] - [phone] PH - [phone] FX  [address] [facility] [address] [address] [place] [address]    Phone: [phone]   · methylPREDNISolone 4 MG dose pack              Tim Armenta MD  11/12/21 4132

## 2021-11-14 LAB — BACTERIA SPEC AEROBE CULT: NORMAL

## 2021-11-21 ENCOUNTER — APPOINTMENT (OUTPATIENT)
Dept: GENERAL RADIOLOGY | Facility: HOSPITAL | Age: 61
End: 2021-11-21

## 2021-11-21 ENCOUNTER — HOSPITAL ENCOUNTER (EMERGENCY)
Facility: HOSPITAL | Age: 61
Discharge: HOME | End: 2021-11-21
Attending: EMERGENCY MEDICINE | Admitting: EMERGENCY MEDICINE

## 2021-11-21 VITALS
HEIGHT: 66 IN | SYSTOLIC BLOOD PRESSURE: 146 MMHG | WEIGHT: 216 LBS | HEART RATE: 84 BPM | BODY MASS INDEX: 34.72 KG/M2 | RESPIRATION RATE: 16 BRPM | OXYGEN SATURATION: 99 % | DIASTOLIC BLOOD PRESSURE: 78 MMHG | TEMPERATURE: 97.8 F

## 2021-11-21 DIAGNOSIS — R00.2 PALPITATIONS: Primary | ICD-10-CM

## 2021-11-21 LAB
ALBUMIN SERPL-MCNC: 4.4 G/DL (ref 3.5–5.2)
ALBUMIN/GLOB SERPL: 1.5 G/DL
ALP SERPL-CCNC: 117 U/L (ref 39–117)
ALT SERPL W P-5'-P-CCNC: 14 U/L (ref 1–33)
ANION GAP SERPL CALCULATED.3IONS-SCNC: 8.1 MMOL/L (ref 5–15)
AST SERPL-CCNC: 18 U/L (ref 1–32)
BASOPHILS # BLD AUTO: 0.06 10*3/MM3 (ref 0–0.2)
BASOPHILS NFR BLD AUTO: 1 % (ref 0–1.5)
BILIRUB SERPL-MCNC: 0.2 MG/DL (ref 0–1.2)
BUN SERPL-MCNC: 14 MG/DL (ref 8–23)
BUN/CREAT SERPL: 11.1 (ref 7–25)
CALCIUM SPEC-SCNC: 9.7 MG/DL (ref 8.6–10.5)
CHLORIDE SERPL-SCNC: 102 MMOL/L (ref 98–107)
CO2 SERPL-SCNC: 31.9 MMOL/L (ref 22–29)
CREAT SERPL-MCNC: 1.26 MG/DL (ref 0.57–1)
DEPRECATED RDW RBC AUTO: 47.2 FL (ref 37–54)
EOSINOPHIL # BLD AUTO: 0.21 10*3/MM3 (ref 0–0.4)
EOSINOPHIL NFR BLD AUTO: 3.5 % (ref 0.3–6.2)
ERYTHROCYTE [DISTWIDTH] IN BLOOD BY AUTOMATED COUNT: 15.8 % (ref 12.3–15.4)
GFR SERPL CREATININE-BSD FRML MDRD: 52 ML/MIN/1.73
GLOBULIN UR ELPH-MCNC: 3 GM/DL
GLUCOSE SERPL-MCNC: 113 MG/DL (ref 65–99)
HCT VFR BLD AUTO: 41.7 % (ref 34–46.6)
HGB BLD-MCNC: 12.7 G/DL (ref 12–15.9)
IMM GRANULOCYTES # BLD AUTO: 0 10*3/MM3 (ref 0–0.05)
IMM GRANULOCYTES NFR BLD AUTO: 0 % (ref 0–0.5)
LYMPHOCYTES # BLD AUTO: 2.24 10*3/MM3 (ref 0.7–3.1)
LYMPHOCYTES NFR BLD AUTO: 37 % (ref 19.6–45.3)
MCH RBC QN AUTO: 25.2 PG (ref 26.6–33)
MCHC RBC AUTO-ENTMCNC: 30.5 G/DL (ref 31.5–35.7)
MCV RBC AUTO: 82.9 FL (ref 79–97)
MONOCYTES # BLD AUTO: 0.61 10*3/MM3 (ref 0.1–0.9)
MONOCYTES NFR BLD AUTO: 10.1 % (ref 5–12)
NEUTROPHILS NFR BLD AUTO: 2.94 10*3/MM3 (ref 1.7–7)
NEUTROPHILS NFR BLD AUTO: 48.4 % (ref 42.7–76)
NRBC BLD AUTO-RTO: 0 /100 WBC (ref 0–0.2)
NT-PROBNP SERPL-MCNC: 61.2 PG/ML (ref 0–900)
PLATELET # BLD AUTO: 253 10*3/MM3 (ref 140–450)
PMV BLD AUTO: 10.3 FL (ref 6–12)
POTASSIUM SERPL-SCNC: 4.3 MMOL/L (ref 3.5–5.2)
PROT SERPL-MCNC: 7.4 G/DL (ref 6–8.5)
RBC # BLD AUTO: 5.03 10*6/MM3 (ref 3.77–5.28)
SODIUM SERPL-SCNC: 142 MMOL/L (ref 136–145)
TROPONIN T SERPL-MCNC: <0.01 NG/ML (ref 0–0.03)
WBC NRBC COR # BLD: 6.06 10*3/MM3 (ref 3.4–10.8)

## 2021-11-21 PROCEDURE — 85025 COMPLETE CBC W/AUTO DIFF WBC: CPT | Performed by: EMERGENCY MEDICINE

## 2021-11-21 PROCEDURE — 80053 COMPREHEN METABOLIC PANEL: CPT | Performed by: EMERGENCY MEDICINE

## 2021-11-21 PROCEDURE — 99283 EMERGENCY DEPT VISIT LOW MDM: CPT

## 2021-11-21 PROCEDURE — 99283 EMERGENCY DEPT VISIT LOW MDM: CPT | Performed by: EMERGENCY MEDICINE

## 2021-11-21 PROCEDURE — 93005 ELECTROCARDIOGRAM TRACING: CPT | Performed by: EMERGENCY MEDICINE

## 2021-11-21 PROCEDURE — 93005 ELECTROCARDIOGRAM TRACING: CPT

## 2021-11-21 PROCEDURE — 36415 COLL VENOUS BLD VENIPUNCTURE: CPT

## 2021-11-21 PROCEDURE — 83880 ASSAY OF NATRIURETIC PEPTIDE: CPT | Performed by: EMERGENCY MEDICINE

## 2021-11-21 PROCEDURE — 84484 ASSAY OF TROPONIN QUANT: CPT | Performed by: EMERGENCY MEDICINE

## 2021-11-21 PROCEDURE — 71046 X-RAY EXAM CHEST 2 VIEWS: CPT

## 2021-11-21 PROCEDURE — 93010 ELECTROCARDIOGRAM REPORT: CPT | Performed by: INTERNAL MEDICINE

## 2021-11-21 RX ORDER — SODIUM CHLORIDE 0.9 % (FLUSH) 0.9 %
10 SYRINGE (ML) INJECTION AS NEEDED
Status: DISCONTINUED | OUTPATIENT
Start: 2021-11-21 | End: 2021-11-21 | Stop reason: HOSPADM

## 2021-11-21 RX ORDER — LISINOPRIL AND HYDROCHLOROTHIAZIDE 20; 12.5 MG/1; MG/1
1 TABLET ORAL DAILY
COMMUNITY
End: 2022-08-12 | Stop reason: HOSPADM

## 2021-11-21 RX ORDER — ASPIRIN 325 MG
325 TABLET ORAL AS NEEDED
Status: ON HOLD | COMMUNITY
End: 2022-08-11

## 2021-11-21 NOTE — ED PROVIDER NOTES
" EMERGENCY DEPARTMENT ENCOUNTER      Room Number: 06/06      HPI:    Chief complaint: Jaw pain/numbness and palpitations    Location: Left jaw    Quality/Severity: Minor    Timing/Duration: Symptoms started approximately 8 hours ago    Modifying Factors: Jaw pain did seem to get better after taking aspirin    Associated Symptoms: Mild left shoulder pain    Narrative: Pt is a 61 y.o. female who presents complaining of left jaw pain/numbness and palpitations as noted above.  Patient relates that when she awoke this morning she had some pain at the angle on the left side of her mandible.  She also noted some mild left anterior shoulder pain.  No associated chest pain and the patient took aspirin.  The aspirin did seem to alleviate the pain although she did complain of some residual numbness in the left mandible.  Late this afternoon the patient did take a short nap and when she awoke she felt like her heart was beating very fast.  No associated chest pain, nausea, vomiting, diaphoresis or shortness of breath.  Patient relates that she is never had something like this happen before and just wants to \"get checked out\".      PMD: Johnie Wood MD    REVIEW OF SYSTEMS  Review of Systems   Constitutional: Negative for activity change, appetite change, fatigue and fever.   HENT: Negative for congestion.    Respiratory: Negative for cough, shortness of breath and wheezing.    Cardiovascular: Positive for palpitations and leg swelling (Occasional and mild). Negative for chest pain.        Left mandible pain as noted previously   Gastrointestinal: Negative for abdominal pain, diarrhea, nausea and vomiting.   Endocrine: Negative for polydipsia.   Genitourinary: Negative for difficulty urinating, dysuria, flank pain, frequency and urgency.   Musculoskeletal: Negative for back pain.   Skin: Negative for rash.   Neurological: Negative for dizziness, weakness and headaches.   Psychiatric/Behavioral: Negative for " confusion.   All other systems reviewed and are negative.      PAST MEDICAL HISTORY  Active Ambulatory Problems     Diagnosis Date Noted   • Soft tissue tumor 10/08/2018   • Abnormal EKG 12/16/2019   • Irregular heart beat 12/16/2019   • Primary osteoarthritis of knees, bilateral 08/26/2020   • Personal history of colonic polyps 06/08/2021   • Family history of colonic polyps 06/08/2021   • Encounter for screening for malignant neoplasm of colon 06/08/2021     Resolved Ambulatory Problems     Diagnosis Date Noted   • No Resolved Ambulatory Problems     Past Medical History:   Diagnosis Date   • Bowel obstruction (HCC) 2016   • Colon polyp    • Disease of thyroid gland    • Heart murmur    • Hyperlipidemia    • PONV (postoperative nausea and vomiting)        PAST SURGICAL HISTORY  Past Surgical History:   Procedure Laterality Date   • APPENDECTOMY     • CHEST WALL MASS EXCISION Right 10/17/2018    Procedure: EXCISION OF RIGHT CHEST WALL MASS;  Surgeon: Tim Palencia MD;  Location: [facility] OR;  Service: General   • COLON SURGERY  2016    states piece removed d/t obstruction/scar tissue   • COLONOSCOPY N/A 11/28/2016    Procedure: COLONOSCOPY with polypectomy;  Surgeon: Kristy Caballero MD;  Location: [facility] OR;  Service:    • OVARIAN CYST SURGERY      x2       FAMILY HISTORY  Family History   Problem Relation Age of Onset   • Stroke Mother    • Hypertension Mother    • Alzheimer's disease Mother    • Prostate cancer Father    • Heart disease Father         pacemaker   • Colon polyps Sister    • Hypertension Brother    • Prostate cancer Brother    • Malig Hyperthermia Neg Hx    • Breast cancer Neg Hx        SOCIAL HISTORY  Social History     Socioeconomic History   • Marital status:    Tobacco Use   • Smoking status: Never Smoker   • Smokeless tobacco: Never Used   Substance and Sexual Activity   • Alcohol use: Not Currently     Comment: rarely   • Drug use: No   • Sexual activity: Defer        ALLERGIES  Antihistamines, diphenhydramine-type; Claritin [loratadine]; and Other    PHYSICAL EXAM  ED Triage Vitals [11/21/21 1741]   Temp Heart Rate Resp BP SpO2   98.1 °F (36.7 °C) 101 18 (!) 168/114 100 %      Temp src Heart Rate Source Patient Position BP Location FiO2 (%)   Oral Monitor Lying Right arm --       Physical Exam  Vitals and nursing note reviewed.   Constitutional:       Comments: Obese, 61-year-old, female no acute distress.   HENT:      Head: Normocephalic and atraumatic.   Eyes:      Extraocular Movements: Extraocular movements intact.      Conjunctiva/sclera: Conjunctivae normal.   Neck:      Thyroid: No thyromegaly.   Cardiovascular:      Rate and Rhythm: Normal rate.      Heart sounds: Normal heart sounds. No murmur heard.      Pulmonary:      Effort: Pulmonary effort is normal. No respiratory distress.      Breath sounds: Normal breath sounds.   Abdominal:      General: Bowel sounds are normal.      Palpations: Abdomen is soft.      Tenderness: There is no abdominal tenderness.   Musculoskeletal:         General: Normal range of motion.      Cervical back: Normal range of motion and neck supple.      Right lower leg: No edema.      Left lower leg: No edema.   Lymphadenopathy:      Cervical: No cervical adenopathy.   Skin:     General: Skin is warm and dry.   Neurological:      General: No focal deficit present.      Mental Status: She is alert and oriented to person, place, and time.      Cranial Nerves: No cranial nerve deficit.   Psychiatric:         Mood and Affect: Mood and affect normal.         Behavior: Behavior normal.         Thought Content: Thought content normal.         Judgment: Judgment normal.         LAB RESULTS  Results for orders placed or performed during the hospital encounter of 11/21/21   Comprehensive Metabolic Panel    Specimen: Blood   Result Value Ref Range    Glucose 113 (H) 65 - 99 mg/dL    BUN 14 8 - 23 mg/dL    Creatinine 1.26 (H) 0.57 - 1.00 mg/dL     Sodium 142 136 - 145 mmol/L    Potassium 4.3 3.5 - 5.2 mmol/L    Chloride 102 98 - 107 mmol/L    CO2 31.9 (H) 22.0 - 29.0 mmol/L    Calcium 9.7 8.6 - 10.5 mg/dL    Total Protein 7.4 6.0 - 8.5 g/dL    Albumin 4.40 3.50 - 5.20 g/dL    ALT (SGPT) 14 1 - 33 U/L    AST (SGOT) 18 1 - 32 U/L    Alkaline Phosphatase 117 39 - 117 U/L    Total Bilirubin 0.2 0.0 - 1.2 mg/dL    eGFR  African Amer 52 (L) >60 mL/min/1.73    Globulin 3.0 gm/dL    A/G Ratio 1.5 g/dL    BUN/Creatinine Ratio 11.1 7.0 - 25.0    Anion Gap 8.1 5.0 - 15.0 mmol/L   Troponin    Specimen: Blood   Result Value Ref Range    Troponin T <0.010 0.000 - 0.030 ng/mL   BNP    Specimen: Blood   Result Value Ref Range    proBNP 61.2 0.0 - 900.0 pg/mL   CBC Auto Differential    Specimen: Blood   Result Value Ref Range    WBC 6.06 3.40 - 10.80 10*3/mm3    RBC 5.03 3.77 - 5.28 10*6/mm3    Hemoglobin 12.7 12.0 - 15.9 g/dL    Hematocrit 41.7 34.0 - 46.6 %    MCV 82.9 79.0 - 97.0 fL    MCH 25.2 (L) 26.6 - 33.0 pg    MCHC 30.5 (L) 31.5 - 35.7 g/dL    RDW 15.8 (H) 12.3 - 15.4 %    RDW-SD 47.2 37.0 - 54.0 fl    MPV 10.3 6.0 - 12.0 fL    Platelets 253 140 - 450 10*3/mm3    Neutrophil % 48.4 42.7 - 76.0 %    Lymphocyte % 37.0 19.6 - 45.3 %    Monocyte % 10.1 5.0 - 12.0 %    Eosinophil % 3.5 0.3 - 6.2 %    Basophil % 1.0 0.0 - 1.5 %    Immature Grans % 0.0 0.0 - 0.5 %    Neutrophils, Absolute 2.94 1.70 - 7.00 10*3/mm3    Lymphocytes, Absolute 2.24 0.70 - 3.10 10*3/mm3    Monocytes, Absolute 0.61 0.10 - 0.90 10*3/mm3    Eosinophils, Absolute 0.21 0.00 - 0.40 10*3/mm3    Basophils, Absolute 0.06 0.00 - 0.20 10*3/mm3    Immature Grans, Absolute 0.00 0.00 - 0.05 10*3/mm3    nRBC 0.0 0.0 - 0.2 /100 WBC   ECG 12 Lead   Result Value Ref Range    QT Interval 339 ms         I ordered the above labs and reviewed the results    RADIOLOGY  XR Chest 2 View    Result Date: 11/21/2021  Narrative: CR Chest 2 Vws INDICATION:  Pain and left-sided neck COMPARISON:  8/27/2021 FINDINGS: PA and  lateral views of the chest.  Heart and mediastinal contours are normal. The lungs are clear. No pneumothorax or pleural effusion.      Impression: No acute cardiopulmonary findings. Signer Name: Ranjit Perez MD  Signed: 11/21/2021 6:21 PM  Workstation Name: [email or web address]-  [facility]      I ordered the above radiologic testing and reviewed the results    PROCEDURES  Procedures      PROGRESS AND CONSULTS  ED Course as of 11/21/21 1835   Sun Nov 21, 2021   1813 EKG tracing was contemporaneously reviewed at 1745 hrs. and showed a normal sinus rhythm with a rate of 94 bpm.  P waves, QRS complexes, ST segments and T waves all unremarkable.  No ectopy.  Normal ECG. [ML]   1832 While in the emergency department the patient remained in normal sinus rhythm and without complaints.  Diagnosis of palpitations discussed with patient along with treatment plan, expectations and warnings. [ML]      ED Course User Index  [ML] Néstor Cam MD           MEDICAL DECISION MAKING  Results were reviewed/discussed with the patient and they were also made aware of online access. Pt also made aware that some labs, such as cultures, will not be resulted during ER visit and follow up with PMD is necessary.     MDM       DIAGNOSIS  Final diagnoses:   Palpitations       Latest Documented Vital Signs:  As of 18:35 EST  BP- 148/90 HR- 87 Temp- 98.1 °F (36.7 °C) (Oral) O2 sat- 99%    DISPOSITION  Discharged in good condition       Medication List      Stop    baclofen 10 MG tablet  Commonly known as: LIORESAL     camphor-menthol 0.5-0.5 % lotion  Commonly known as: SARNA     meloxicam 15 MG tablet  Commonly known as: MOBIC     methylPREDNISolone 4 MG dose pack  Commonly known as: MEDROL             Follow-up Information     Johnie Wood MD.    Specialty: Family Medicine  Why: As needed  Contact information:  [address]    [place] [address] [phone]                            [place]  Néstor GONZALEZ MD  11/21/21 [id number]

## 2021-11-22 LAB — QT INTERVAL: 339 MS

## 2021-12-06 ENCOUNTER — LAB (OUTPATIENT)
Dept: LAB | Facility: HOSPITAL | Age: 61
End: 2021-12-06

## 2021-12-06 ENCOUNTER — TELEPHONE (OUTPATIENT)
Dept: GASTROENTEROLOGY | Facility: CLINIC | Age: 61
End: 2021-12-06

## 2021-12-06 DIAGNOSIS — Z01.818 OTHER SPECIFIED PRE-OPERATIVE EXAMINATION: ICD-10-CM

## 2021-12-06 DIAGNOSIS — Z01.818 OTHER SPECIFIED PRE-OPERATIVE EXAMINATION: Primary | ICD-10-CM

## 2021-12-06 NOTE — TELEPHONE ENCOUNTER
CALL FROM PATIENT.  NEEDS TO CANCEL PER COLONOSCOPY ON 12/08/2021.  DOES NOT WANT TO RESCHEDULE AT THIS TIME.

## 2021-12-31 ENCOUNTER — HOSPITAL ENCOUNTER (EMERGENCY)
Facility: HOSPITAL | Age: 61
Discharge: HOME | End: 2021-12-31
Attending: EMERGENCY MEDICINE | Admitting: EMERGENCY MEDICINE

## 2021-12-31 VITALS
DIASTOLIC BLOOD PRESSURE: 93 MMHG | RESPIRATION RATE: 16 BRPM | OXYGEN SATURATION: 100 % | WEIGHT: 215 LBS | HEART RATE: 69 BPM | SYSTOLIC BLOOD PRESSURE: 181 MMHG | TEMPERATURE: 97.9 F | HEIGHT: 68 IN | BODY MASS INDEX: 32.58 KG/M2

## 2021-12-31 DIAGNOSIS — L29.9 PRURITUS: Primary | ICD-10-CM

## 2021-12-31 PROCEDURE — 99282 EMERGENCY DEPT VISIT SF MDM: CPT

## 2021-12-31 PROCEDURE — 99282 EMERGENCY DEPT VISIT SF MDM: CPT | Performed by: EMERGENCY MEDICINE

## 2022-08-11 ENCOUNTER — APPOINTMENT (OUTPATIENT)
Dept: GENERAL RADIOLOGY | Facility: HOSPITAL | Age: 62
End: 2022-08-11

## 2022-08-11 ENCOUNTER — HOSPITAL ENCOUNTER (OUTPATIENT)
Facility: HOSPITAL | Age: 62
Setting detail: OBSERVATION
Discharge: HOME | End: 2022-08-12
Attending: EMERGENCY MEDICINE | Admitting: FAMILY MEDICINE

## 2022-08-11 ENCOUNTER — APPOINTMENT (OUTPATIENT)
Dept: CT IMAGING | Facility: HOSPITAL | Age: 62
End: 2022-08-11

## 2022-08-11 DIAGNOSIS — R07.9 CHEST PAIN, UNSPECIFIED TYPE: Primary | ICD-10-CM

## 2022-08-11 LAB
ALBUMIN SERPL-MCNC: 4.3 G/DL (ref 3.5–5.2)
ALBUMIN/GLOB SERPL: 1.2 G/DL
ALP SERPL-CCNC: 103 U/L (ref 39–117)
ALT SERPL W P-5'-P-CCNC: 18 U/L (ref 1–33)
ANION GAP SERPL CALCULATED.3IONS-SCNC: 8.4 MMOL/L (ref 5–15)
AST SERPL-CCNC: 27 U/L (ref 1–32)
BACTERIA UR QL AUTO: ABNORMAL /HPF
BASOPHILS # BLD AUTO: 0.04 10*3/MM3 (ref 0–0.2)
BASOPHILS NFR BLD AUTO: 1 % (ref 0–1.5)
BILIRUB SERPL-MCNC: 0.3 MG/DL (ref 0–1.2)
BILIRUB UR QL STRIP: NEGATIVE
BUN SERPL-MCNC: 14 MG/DL (ref 8–23)
BUN/CREAT SERPL: 14.7 (ref 7–25)
CALCIUM SPEC-SCNC: 9.4 MG/DL (ref 8.6–10.5)
CHLORIDE SERPL-SCNC: 100 MMOL/L (ref 98–107)
CLARITY UR: CLEAR
CO2 SERPL-SCNC: 27.6 MMOL/L (ref 22–29)
COLOR UR: ABNORMAL
CREAT SERPL-MCNC: 0.95 MG/DL (ref 0.57–1)
D DIMER PPP FEU-MCNC: 1.04 MCGFEU/ML (ref 0–0.46)
DEPRECATED RDW RBC AUTO: 48.5 FL (ref 37–54)
EGFRCR SERPLBLD CKD-EPI 2021: 67.5 ML/MIN/1.73
EOSINOPHIL # BLD AUTO: 0.12 10*3/MM3 (ref 0–0.4)
EOSINOPHIL NFR BLD AUTO: 3.1 % (ref 0.3–6.2)
ERYTHROCYTE [DISTWIDTH] IN BLOOD BY AUTOMATED COUNT: 15.5 % (ref 12.3–15.4)
GLOBULIN UR ELPH-MCNC: 3.5 GM/DL
GLUCOSE SERPL-MCNC: 100 MG/DL (ref 65–99)
GLUCOSE UR STRIP-MCNC: NEGATIVE MG/DL
HCT VFR BLD AUTO: 40 % (ref 34–46.6)
HGB BLD-MCNC: 12.6 G/DL (ref 12–15.9)
HGB UR QL STRIP.AUTO: ABNORMAL
HYALINE CASTS UR QL AUTO: ABNORMAL /LPF
IMM GRANULOCYTES # BLD AUTO: 0 10*3/MM3 (ref 0–0.05)
IMM GRANULOCYTES NFR BLD AUTO: 0 % (ref 0–0.5)
KETONES UR QL STRIP: NEGATIVE
LEUKOCYTE ESTERASE UR QL STRIP.AUTO: ABNORMAL
LYMPHOCYTES # BLD AUTO: 1.74 10*3/MM3 (ref 0.7–3.1)
LYMPHOCYTES NFR BLD AUTO: 44.3 % (ref 19.6–45.3)
MCH RBC QN AUTO: 26.5 PG (ref 26.6–33)
MCHC RBC AUTO-ENTMCNC: 31.5 G/DL (ref 31.5–35.7)
MCV RBC AUTO: 84 FL (ref 79–97)
MONOCYTES # BLD AUTO: 0.39 10*3/MM3 (ref 0.1–0.9)
MONOCYTES NFR BLD AUTO: 9.9 % (ref 5–12)
NEUTROPHILS NFR BLD AUTO: 1.64 10*3/MM3 (ref 1.7–7)
NEUTROPHILS NFR BLD AUTO: 41.7 % (ref 42.7–76)
NITRITE UR QL STRIP: NEGATIVE
NT-PROBNP SERPL-MCNC: 127.9 PG/ML (ref 0–900)
PH UR STRIP.AUTO: 7 [PH] (ref 4.5–8)
PLATELET # BLD AUTO: 199 10*3/MM3 (ref 140–450)
PMV BLD AUTO: 9.7 FL (ref 6–12)
POTASSIUM SERPL-SCNC: 3.9 MMOL/L (ref 3.5–5.2)
PROT SERPL-MCNC: 7.8 G/DL (ref 6–8.5)
PROT UR QL STRIP: NEGATIVE
QT INTERVAL: 391 MS
QT INTERVAL: 429 MS
RBC # BLD AUTO: 4.76 10*6/MM3 (ref 3.77–5.28)
RBC # UR STRIP: ABNORMAL /HPF
REF LAB TEST METHOD: ABNORMAL
SARS-COV-2 RNA PNL SPEC NAA+PROBE: NOT DETECTED
SODIUM SERPL-SCNC: 136 MMOL/L (ref 136–145)
SP GR UR STRIP: 1.01 (ref 1–1.03)
SQUAMOUS #/AREA URNS HPF: ABNORMAL /HPF
TROPONIN T SERPL-MCNC: <0.01 NG/ML (ref 0–0.03)
UROBILINOGEN UR QL STRIP: ABNORMAL
WBC # UR STRIP: ABNORMAL /HPF
WBC NRBC COR # BLD: 3.93 10*3/MM3 (ref 3.4–10.8)

## 2022-08-11 PROCEDURE — 83880 ASSAY OF NATRIURETIC PEPTIDE: CPT | Performed by: EMERGENCY MEDICINE

## 2022-08-11 PROCEDURE — G0378 HOSPITAL OBSERVATION PER HR: HCPCS

## 2022-08-11 PROCEDURE — 25010000002 ENOXAPARIN PER 10 MG: Performed by: FAMILY MEDICINE

## 2022-08-11 PROCEDURE — 99284 EMERGENCY DEPT VISIT MOD MDM: CPT

## 2022-08-11 PROCEDURE — 96372 THER/PROPH/DIAG INJ SC/IM: CPT

## 2022-08-11 PROCEDURE — 85025 COMPLETE CBC W/AUTO DIFF WBC: CPT | Performed by: EMERGENCY MEDICINE

## 2022-08-11 PROCEDURE — 25010000002 IOPAMIDOL 61 % SOLUTION: Performed by: EMERGENCY MEDICINE

## 2022-08-11 PROCEDURE — 93005 ELECTROCARDIOGRAM TRACING: CPT | Performed by: EMERGENCY MEDICINE

## 2022-08-11 PROCEDURE — 85379 FIBRIN DEGRADATION QUANT: CPT | Performed by: EMERGENCY MEDICINE

## 2022-08-11 PROCEDURE — 93010 ELECTROCARDIOGRAM REPORT: CPT | Performed by: INTERNAL MEDICINE

## 2022-08-11 PROCEDURE — 71275 CT ANGIOGRAPHY CHEST: CPT

## 2022-08-11 PROCEDURE — 84484 ASSAY OF TROPONIN QUANT: CPT | Performed by: FAMILY MEDICINE

## 2022-08-11 PROCEDURE — 81001 URINALYSIS AUTO W/SCOPE: CPT | Performed by: EMERGENCY MEDICINE

## 2022-08-11 PROCEDURE — 71045 X-RAY EXAM CHEST 1 VIEW: CPT

## 2022-08-11 PROCEDURE — 80053 COMPREHEN METABOLIC PANEL: CPT | Performed by: EMERGENCY MEDICINE

## 2022-08-11 PROCEDURE — 93005 ELECTROCARDIOGRAM TRACING: CPT

## 2022-08-11 PROCEDURE — 87635 SARS-COV-2 COVID-19 AMP PRB: CPT | Performed by: FAMILY MEDICINE

## 2022-08-11 PROCEDURE — C9803 HOPD COVID-19 SPEC COLLECT: HCPCS

## 2022-08-11 PROCEDURE — 84484 ASSAY OF TROPONIN QUANT: CPT | Performed by: EMERGENCY MEDICINE

## 2022-08-11 RX ORDER — ROSUVASTATIN CALCIUM 5 MG/1
10 TABLET, COATED ORAL NIGHTLY
Status: DISCONTINUED | OUTPATIENT
Start: 2022-08-11 | End: 2022-08-12 | Stop reason: HOSPADM

## 2022-08-11 RX ORDER — SODIUM CHLORIDE 0.9 % (FLUSH) 0.9 %
1-10 SYRINGE (ML) INJECTION AS NEEDED
Status: DISCONTINUED | OUTPATIENT
Start: 2022-08-11 | End: 2022-08-12 | Stop reason: HOSPADM

## 2022-08-11 RX ORDER — LEVOTHYROXINE SODIUM 0.05 MG/1
100 TABLET ORAL
Status: DISCONTINUED | OUTPATIENT
Start: 2022-08-12 | End: 2022-08-12 | Stop reason: HOSPADM

## 2022-08-11 RX ORDER — ONDANSETRON 2 MG/ML
4 INJECTION INTRAMUSCULAR; INTRAVENOUS EVERY 6 HOURS PRN
Status: DISCONTINUED | OUTPATIENT
Start: 2022-08-11 | End: 2022-08-12 | Stop reason: HOSPADM

## 2022-08-11 RX ORDER — ENOXAPARIN SODIUM 100 MG/ML
40 INJECTION SUBCUTANEOUS DAILY
Status: DISCONTINUED | OUTPATIENT
Start: 2022-08-11 | End: 2022-08-12 | Stop reason: HOSPADM

## 2022-08-11 RX ORDER — DIPHENHYDRAMINE HCL 25 MG
25 TABLET ORAL EVERY 6 HOURS PRN
COMMUNITY
End: 2023-01-28 | Stop reason: SDUPTHER

## 2022-08-11 RX ORDER — SODIUM CHLORIDE 9 MG/ML
40 INJECTION, SOLUTION INTRAVENOUS AS NEEDED
Status: DISCONTINUED | OUTPATIENT
Start: 2022-08-11 | End: 2022-08-12 | Stop reason: HOSPADM

## 2022-08-11 RX ORDER — NITROGLYCERIN 0.4 MG/1
0.4 TABLET SUBLINGUAL
Status: DISCONTINUED | OUTPATIENT
Start: 2022-08-11 | End: 2022-08-12 | Stop reason: HOSPADM

## 2022-08-11 RX ORDER — ACETAMINOPHEN 650 MG/1
650 SUPPOSITORY RECTAL EVERY 4 HOURS PRN
Status: DISCONTINUED | OUTPATIENT
Start: 2022-08-11 | End: 2022-08-12 | Stop reason: HOSPADM

## 2022-08-11 RX ORDER — CHOLECALCIFEROL (VITAMIN D3) 125 MCG
5 CAPSULE ORAL NIGHTLY PRN
Status: DISCONTINUED | OUTPATIENT
Start: 2022-08-11 | End: 2022-08-12 | Stop reason: HOSPADM

## 2022-08-11 RX ORDER — SODIUM CHLORIDE 0.9 % (FLUSH) 0.9 %
10 SYRINGE (ML) INJECTION AS NEEDED
Status: DISCONTINUED | OUTPATIENT
Start: 2022-08-11 | End: 2022-08-12 | Stop reason: HOSPADM

## 2022-08-11 RX ORDER — NITROGLYCERIN 0.4 MG/1
0.4 TABLET SUBLINGUAL
Status: COMPLETED | OUTPATIENT
Start: 2022-08-11 | End: 2022-08-11

## 2022-08-11 RX ORDER — ACETAMINOPHEN 160 MG/5ML
650 SOLUTION ORAL EVERY 4 HOURS PRN
Status: DISCONTINUED | OUTPATIENT
Start: 2022-08-11 | End: 2022-08-12 | Stop reason: HOSPADM

## 2022-08-11 RX ORDER — ACETAMINOPHEN 325 MG/1
650 TABLET ORAL EVERY 4 HOURS PRN
Status: DISCONTINUED | OUTPATIENT
Start: 2022-08-11 | End: 2022-08-12 | Stop reason: HOSPADM

## 2022-08-11 RX ORDER — SODIUM CHLORIDE 0.9 % (FLUSH) 0.9 %
10 SYRINGE (ML) INJECTION EVERY 12 HOURS SCHEDULED
Status: DISCONTINUED | OUTPATIENT
Start: 2022-08-11 | End: 2022-08-12 | Stop reason: HOSPADM

## 2022-08-11 RX ORDER — ONDANSETRON 4 MG/1
4 TABLET, FILM COATED ORAL EVERY 6 HOURS PRN
Status: DISCONTINUED | OUTPATIENT
Start: 2022-08-11 | End: 2022-08-12 | Stop reason: HOSPADM

## 2022-08-11 RX ADMIN — Medication 10 ML: at 15:45

## 2022-08-11 RX ADMIN — NITROGLYCERIN 0.4 MG: 0.4 TABLET SUBLINGUAL at 10:06

## 2022-08-11 RX ADMIN — Medication 10 ML: at 20:14

## 2022-08-11 RX ADMIN — ACETAMINOPHEN 650 MG: 325 TABLET ORAL at 15:51

## 2022-08-11 RX ADMIN — IOPAMIDOL 77 ML: 612 INJECTION, SOLUTION INTRAVENOUS at 11:39

## 2022-08-11 RX ADMIN — NITROGLYCERIN 0.4 MG: 0.4 TABLET SUBLINGUAL at 10:33

## 2022-08-11 RX ADMIN — NITROGLYCERIN 0.4 MG: 0.4 TABLET SUBLINGUAL at 10:18

## 2022-08-11 RX ADMIN — NITROGLYCERIN 0.4 MG: 0.4 TABLET SUBLINGUAL at 15:19

## 2022-08-11 RX ADMIN — ENOXAPARIN SODIUM 40 MG: 40 INJECTION SUBCUTANEOUS at 15:45

## 2022-08-11 RX ADMIN — NITROGLYCERIN 0.4 MG: 0.4 TABLET SUBLINGUAL at 15:12

## 2022-08-11 RX ADMIN — SODIUM CHLORIDE, POTASSIUM CHLORIDE, SODIUM LACTATE AND CALCIUM CHLORIDE 500 ML: 600; 310; 30; 20 INJECTION, SOLUTION INTRAVENOUS at 10:31

## 2022-08-11 RX ADMIN — NITROGLYCERIN 0.4 MG: 0.4 TABLET SUBLINGUAL at 15:05

## 2022-08-11 RX ADMIN — ROSUVASTATIN CALCIUM 10 MG: 5 TABLET, FILM COATED ORAL at 20:14

## 2022-08-11 NOTE — PLAN OF CARE
Goal Outcome Evaluation:  Plan of Care Reviewed With: patient        Progress: no change  Outcome Evaluation: New admit of Dr. FERREIRA admitted to 1413 for chest pain. She is awake, talkative, on room air. Pain rates a 4, miid sternum to back Nitro given as ordered

## 2022-08-11 NOTE — NURSING NOTE
"Patient has multiple food allergies:  Pt voiced \"daughter will bring her meals to hospital for her.\"    Allergies:    Orange dye  Bell Peppers  Cows milk  Dairy  Yogurt  Mozzerella cheese   Carmen  Glass Beans  Soy Beans  Soda pop ( can only have Sprite)  Gluten Free  Rye  Carmen  Mustard Seed  Wheat  Eggs  Chicken  Leal      Pt also voiced she has been to the hospital a few times with these allergies.  "

## 2022-08-11 NOTE — ED PROVIDER NOTES
Subjective   History of Present Illness  61-year-old female presents the emergency room complaining of constant, nonradiating substernal chest pain described as tightness/pressure since awakening this morning at 8:00.  She denies alleviating or exacerbating factors.  Denies having pain like this in the past.  She has a medical history of hypertension, hyperlipidemia, hypothyroidism and obesity.  She denies any dyspnea on exertion, denies exertional chest pain, denies orthopnea or PND.  Family history is notable for heart disease in the father though she cannot specify what type.  Denies smoking denies drinking.  Review of Systems   All other systems reviewed and are negative.      Past Medical History:   Diagnosis Date   • Bowel obstruction (HCC) 2016    h/o   • Colon polyp    • Disease of thyroid gland     hypothyroidism   • Heart murmur    • Hyperlipidemia    • PONV (postoperative nausea and vomiting)     had after w/pain pill, not sure what type       Allergies   Allergen Reactions   • Antihistamines, Diphenhydramine-Type Hives   • Claritin [Loratadine] Hives   • Other Hives     Pt states all Antihistamines cause hives       Past Surgical History:   Procedure Laterality Date   • APPENDECTOMY     • CHEST WALL MASS EXCISION Right 10/17/2018    Procedure: EXCISION OF RIGHT CHEST WALL MASS;  Surgeon: Tim Palencia MD;  Location: [facility] OR;  Service: General   • COLON SURGERY  2016    states piece removed d/t obstruction/scar tissue   • COLONOSCOPY N/A 11/28/2016    Procedure: COLONOSCOPY with polypectomy;  Surgeon: Kristy Caballero MD;  Location: [facility] OR;  Service:    • OVARIAN CYST SURGERY      x2       Family History   Problem Relation Age of Onset   • Stroke Mother    • Hypertension Mother    • Alzheimer's disease Mother    • Prostate cancer Father    • Heart disease Father         pacemaker   • Colon polyps Sister    • Hypertension Brother    • Prostate cancer Brother    • Malig Hyperthermia Neg Hx    • Breast  cancer Neg Hx        Social History     Socioeconomic History   • Marital status:    Tobacco Use   • Smoking status: Never Smoker   • Smokeless tobacco: Never Used   Substance and Sexual Activity   • Alcohol use: Not Currently     Comment: rarely   • Drug use: No   • Sexual activity: Defer           Objective   Physical Exam  Nursing note reviewed.  INITIAL VITAL SIGNS: Reviewed by me.  Pulse ox normal  GENERAL: Alert and interactive. No acute distress.  HEAD: Head is normocephalic.  EYES: EOMI. PERRL. No scleral icterus. No conjunctival injection.  ENT: Moist mucous membranes.   NECK: Supple. Full range of motion.  RESPIRATORY: No tachypnea. Clear breath sounds bilaterally. No wheezing. No rales. No rhonchi.  CV: Regular rate and rhythm. No murmurs. No rubs or gallops.  ABDOMEN: Soft, nondistended, nontender. No guarding. No rebound. No masses.   BACK:  No obvious deformity.  EXTREMITIES: No deformity. No clubbing or cyanosis. No edema.   SKIN: Warm and dry. No diaphoresis. No obvious rashes.   NEUROLOGIC: Alert and oriented. Face is symmetric. Speech is normal. Moves all extremities equally. Motor and sensory distally intact.         Procedures      EKG           EKG time/Interp time: 0945/0948  Rhythm/Rate: Sinus rhythm rate of 69  P waves and KY: Normal KY interval  QRS, axis: Normal QRS duration with normal axis  ST and T waves: No ST elevations or depressions concerning for acute ischemia    Independently interpreted by me contemporaneously with treatment        ED Course  ED Course as of 08/11/22 1341   Thu Aug 11, 2022   1137 Patient's troponin was negative, dimer is elevated obtaining CTA.  Her chest pain was notably improved after 3 nitros. [RO]   1340 Even though patient's work-up is negative, with her risk factors she has a heart score of 4 so I think she should be admitted, have discussed with her primary care physician Dr. Wood who will admit to the hospital. [RO]      ED Course User  Index  [RO] Orion Durham MD                                           [facility]    Final diagnoses:   Chest pain, unspecified type       ED Disposition  ED Disposition     ED Disposition   Decision to Admit    Condition   --    Comment   Level of Care: Telemetry [5]   Diagnosis: Chest pain, unspecified type [[id number]]   Admitting Physician: PAIGE BAH [[id number]]               No follow-up provider specified.       Medication List      No changes were made to your prescriptions during this visit.          Orion Durham MD  08/11/22 [id number]

## 2022-08-11 NOTE — ED NOTES
Pt complaining of midsternal chest tightness since waking up this AM. Pt denies SOB. Pt stated that she took aspirin prior to coming to the ER. Pt denies any cardiac hx. Pt stated that the pain is constant since waking up.

## 2022-08-12 ENCOUNTER — APPOINTMENT (OUTPATIENT)
Dept: CARDIOLOGY | Facility: HOSPITAL | Age: 62
End: 2022-08-12

## 2022-08-12 ENCOUNTER — APPOINTMENT (OUTPATIENT)
Dept: ULTRASOUND IMAGING | Facility: HOSPITAL | Age: 62
End: 2022-08-12

## 2022-08-12 ENCOUNTER — APPOINTMENT (OUTPATIENT)
Dept: NUCLEAR MEDICINE | Facility: HOSPITAL | Age: 62
End: 2022-08-12

## 2022-08-12 VITALS
TEMPERATURE: 98 F | RESPIRATION RATE: 16 BRPM | SYSTOLIC BLOOD PRESSURE: 139 MMHG | OXYGEN SATURATION: 100 % | DIASTOLIC BLOOD PRESSURE: 92 MMHG | HEIGHT: 66 IN | HEART RATE: 84 BPM | BODY MASS INDEX: 31.4 KG/M2 | WEIGHT: 195.4 LBS

## 2022-08-12 PROBLEM — R94.31 ABNORMAL EKG: Status: RESOLVED | Noted: 2019-12-16 | Resolved: 2022-08-12

## 2022-08-12 PROBLEM — I49.9 IRREGULAR HEART BEAT: Status: RESOLVED | Noted: 2019-12-16 | Resolved: 2022-08-12

## 2022-08-12 PROBLEM — D49.2 SOFT TISSUE TUMOR: Status: RESOLVED | Noted: 2018-10-08 | Resolved: 2022-08-12

## 2022-08-12 LAB
BH CV REST NUCLEAR ISOTOPE DOSE: 11.3 MCI
BH CV STRESS BP STAGE 1: NORMAL
BH CV STRESS BP STAGE 2: NORMAL
BH CV STRESS DURATION MIN STAGE 1: 3
BH CV STRESS DURATION MIN STAGE 2: 1
BH CV STRESS DURATION SEC STAGE 1: 0
BH CV STRESS DURATION SEC STAGE 2: 30
BH CV STRESS GRADE STAGE 1: 10
BH CV STRESS GRADE STAGE 2: 12
BH CV STRESS HR STAGE 1: 128
BH CV STRESS HR STAGE 2: 148
BH CV STRESS METS STAGE 1: 5
BH CV STRESS METS STAGE 2: 6.4
BH CV STRESS NUCLEAR ISOTOPE DOSE: 35 MCI
BH CV STRESS PROTOCOL 1: NORMAL
BH CV STRESS RECOVERY BP: NORMAL MMHG
BH CV STRESS RECOVERY HR: 92 BPM
BH CV STRESS SPEED STAGE 1: 1.7
BH CV STRESS SPEED STAGE 2: 2.5
BH CV STRESS STAGE 1: 1
BH CV STRESS STAGE 2: 2
LV EF NUC BP: 53 %
MAXIMAL PREDICTED HEART RATE: 157 BPM
PERCENT MAX PREDICTED HR: 95.54 %
STRESS BASELINE BP: NORMAL MMHG
STRESS BASELINE HR: 65 BPM
STRESS O2 SAT REST: 100 %
STRESS PERCENT HR: 112 %
STRESS POST ESTIMATED WORKLOAD: 6.4 METS
STRESS POST EXERCISE DUR MIN: 4 MIN
STRESS POST EXERCISE DUR SEC: 30 SEC
STRESS POST O2 SAT PEAK: 100 %
STRESS POST PEAK BP: NORMAL MMHG
STRESS POST PEAK HR: 150 BPM
STRESS TARGET HR: 133 BPM
TROPONIN T SERPL-MCNC: <0.01 NG/ML (ref 0–0.03)

## 2022-08-12 PROCEDURE — G0378 HOSPITAL OBSERVATION PER HR: HCPCS

## 2022-08-12 PROCEDURE — 93016 CV STRESS TEST SUPVJ ONLY: CPT | Performed by: INTERNAL MEDICINE

## 2022-08-12 PROCEDURE — 78452 HT MUSCLE IMAGE SPECT MULT: CPT

## 2022-08-12 PROCEDURE — 84484 ASSAY OF TROPONIN QUANT: CPT | Performed by: FAMILY MEDICINE

## 2022-08-12 PROCEDURE — A9500 TC99M SESTAMIBI: HCPCS | Performed by: FAMILY MEDICINE

## 2022-08-12 PROCEDURE — 93017 CV STRESS TEST TRACING ONLY: CPT

## 2022-08-12 PROCEDURE — 78452 HT MUSCLE IMAGE SPECT MULT: CPT | Performed by: INTERNAL MEDICINE

## 2022-08-12 PROCEDURE — 93971 EXTREMITY STUDY: CPT

## 2022-08-12 PROCEDURE — 0 TECHNETIUM SESTAMIBI: Performed by: FAMILY MEDICINE

## 2022-08-12 PROCEDURE — 93018 CV STRESS TEST I&R ONLY: CPT | Performed by: INTERNAL MEDICINE

## 2022-08-12 RX ADMIN — LEVOTHYROXINE SODIUM 100 MCG: 50 TABLET ORAL at 05:56

## 2022-08-12 RX ADMIN — TECHNETIUM TC 99M SESTAMIBI 1 DOSE: 1 INJECTION INTRAVENOUS at 10:55

## 2022-08-12 RX ADMIN — Medication 10 ML: at 08:37

## 2022-08-12 RX ADMIN — TECHNETIUM TC 99M SESTAMIBI 1 DOSE: 1 INJECTION INTRAVENOUS at 09:25

## 2022-08-12 NOTE — CASE MANAGEMENT/SOCIAL WORK
Discharge Planning Assessment  [facility]OT[facility]     Patient Name: Opal Ardon  MRN: [id number]  Today's Date: 8/12/2022    Admit Date: 8/11/2022     Discharge Needs Assessment     Row Name 08/12/22 1037       Living Environment    People in Home spouse;child(leonardo), adult;grandchild(leonardo)    Name(s) of People in Home Lars,  and her adult daughter and her two children    Current Living Arrangements home    Duration at Residence 6 Y    Potentially Unsafe Housing Conditions --  none    Primary Care Provided by self    Provides Primary Care For no one    Caregiving Concerns no care giving concerns voiced by patient at this time.    Family Caregiver if Needed child(leonardo), adult    Family Caregiver Names adult daughter    Quality of Family Relationships unable to assess    Able to Return to Prior Arrangements yes    Living Arrangement Comments Patient states that she lives with her  and adult daughter and two grandchildren in a two story house with a basement and has two steps to gain entry       Resource/Environmental Concerns    Resource/Environmental Concerns none    Transportation Concerns none       Transition Planning    Patient/Family Anticipates Transition to home with family    Patient/Family Anticipated Services at Transition none    Transportation Anticipated family or friend will provide  patient states that her daughter will be able to provide ride home at discharge       Discharge Needs Assessment    Readmission Within the Last 30 Days no previous admission in last 30 days    Current Outpatient/Agency/Support Group --  none    Equipment Currently Used at Home none    Concerns to be Addressed denies needs/concerns at this time;discharge planning    Concerns Comments no discharge needs voiced by patient at this time    Anticipated Changes Related to Illness none    Equipment Needed After Discharge none    Outpatient/Agency/Support Group Needs --  patient states she will not need these services at  discharge    Discharge Facility/Level of Care Needs --  patient states she will not need these services at discharge    Provided Post Acute Provider List? Refused    Refused Provider List Comment pt declined    Patient's Choice of Community Agency(s) none    Current Discharge Risk --  none    Discharge Coordination/Progress Patient states that she plans on returning home at discharge with  her family to help if needed, no discharge needs voiced by patient at this time.               Discharge Plan     Row Name 08/12/22 1042       Plan    Plan Home with     Patient/Family in Agreement with Plan yes    Plan Comments Into room and introduced self and role of CM. Discussed discharge disposition with patient at bedside. Patient is currently up in room independently and has no complaints. Patient confirms that the info on her face sheet is correct and that she see's Dr. Wood as PCP. She states that she uses [facility] pharmacy in [place] and currently has no problem picking up or paying for her meds. She also states that she does not have a living will and declines information regarding one. Patient states she lives with her , daughter and two grandchildren in a two story house with a basement and two steps to gain entry and has issues maneuvering the steps or within the home. She states that she is independent with her ADL's and drives and that her daughter will be able to provide ride home at discharge. She also states that she currently does not use any DME at home and does not anticipate needing any equipment at discharge. Patient states she has not used home health in the past and states she will not need this service at discharge. CM offered community resources such as HH, STR and LTC but patient declines the need for them at this time. Patient states she plans on returning home at discharge with her family to help if needed, no discharge needs voiced by patient at this time. Patient had no other  questions or concerns regarding discharge plans. Name and number placed on white board in room. CM will continue to follow for needs.              Continued Care and Services - Admitted Since 8/11/2022    Coordination has not been started for this encounter.          Demographic Summary     Row Name 08/12/22 1037       General Information    Admission Type observation    Arrived From home    Referral Source admission list    Reason for Consult discharge planning    Preferred Language English       Contact Information    Permission Granted to Share Info With                Functional Status    No documentation.                Psychosocial    No documentation.                Abuse/Neglect    No documentation.                Legal    No documentation.                Substance Abuse    No documentation.                Patient Forms    No documentation.                   Delmy Tamayo, RN

## 2022-08-12 NOTE — CASE MANAGEMENT/SOCIAL WORK
Case Management Discharge Note      Final Note: Discharged home.    Provided Post Acute Provider List?: Refused  Refused Provider List Comment: pt declined    Selected Continued Care - Discharged on 8/12/2022 Admission date: 8/11/2022 - Discharge disposition: Home or Self Care    Destination    No services have been selected for the patient.              Durable Medical Equipment    No services have been selected for the patient.              Dialysis/Infusion    No services have been selected for the patient.              Home Medical Care    No services have been selected for the patient.              Therapy    No services have been selected for the patient.              Community Resources    No services have been selected for the patient.              Community & DME    No services have been selected for the patient.                       Final Discharge Disposition Code: 01 - home or self-care

## 2022-08-12 NOTE — PLAN OF CARE
Goal Outcome Evaluation:  Plan of Care Reviewed With: patient        Progress: no change  Outcome Evaluation: r/a. SR per telemetry. No c/o chest pain tonight. Up independently to restroom and voiding. Pt reports bm 8/11.

## 2022-08-12 NOTE — H&P
HISTORY AND PHYSICAL      Patient Care Team:  Johnie Wood MD as PCP - General (Family Medicine)    CHIEF COMPLAINT: Chest pain    HISTORY OF PRESENT ILLNESS:    58-year-old -American female with history hypertension hypercholesterolemia hypothyroidism presented emergency room with sudden onset of substernal chest pain felt like a pressure sensation lasted for over half an hour She came to emergency room was given some nitroglycerin with some improvement.  Denies any nausea vomiting dyspepsia shortness of breath or diaphoresis with chest pain.  She has never had any pain before.  She denies any dyspepsia or reflux problems.    Past Medical History:   Diagnosis Date   • Bowel obstruction (HCC) 2016    h/o   • Colon polyp    • Disease of thyroid gland     hypothyroidism   • Heart murmur    • Hyperlipidemia    • PONV (postoperative nausea and vomiting)     had after w/pain pill, not sure what type     Past Surgical History:   Procedure Laterality Date   • APPENDECTOMY     • CHEST WALL MASS EXCISION Right 10/17/2018    Procedure: EXCISION OF RIGHT CHEST WALL MASS;  Surgeon: Tim Palencia MD;  Location: [facility] OR;  Service: General   • COLON SURGERY  2016    states piece removed d/t obstruction/scar tissue   • COLONOSCOPY N/A 11/28/2016    Procedure: COLONOSCOPY with polypectomy;  Surgeon: Kristy Caballero MD;  Location: [facility] OR;  Service:    • OVARIAN CYST SURGERY      x2     Family History   Problem Relation Age of Onset   • Stroke Mother    • Hypertension Mother    • Alzheimer's disease Mother    • Prostate cancer Father    • Heart disease Father         pacemaker   • Colon polyps Sister    • Hypertension Brother    • Prostate cancer Brother    • Malig Hyperthermia Neg Hx    • Breast cancer Neg Hx      Social History     Tobacco Use   • Smoking status: Never Smoker   • Smokeless tobacco: Never Used   Vaping Use   • Vaping Use: Never used   Substance Use Topics   • Alcohol use: Not Currently      Comment: rarely   • Drug use: No     Medications Prior to Admission   Medication Sig Dispense Refill Last Dose   • rosuvastatin (CRESTOR) 10 MG tablet Take 10 mg by mouth Daily.   8/10/2022 at 1000   • Synthroid 100 MCG tablet Take 100 mcg by mouth Every Morning.   8/11/2022 at 0500   • diphenhydrAMINE (Benadryl Allergy) 25 MG tablet Take 25 mg by mouth Every 6 (Six) Hours As Needed for Itching (takes 1-2 when eats something and developes a rash).      • lisinopril-hydrochlorothiazide (PRINZIDE,ZESTORETIC) 20-12.5 MG per tablet Take 1 tablet by mouth Daily. Stopped taking recently      • vitamin D (ERGOCALCIFEROL) 1.25 MG (23983 UT) capsule capsule Take 50,000 Units by mouth 1 (One) Time Per Week. On SUnday's 8/7/2022 at 1000     Allergies:  Antihistamines, diphenhydramine-type; Capsicum annuum extract & derivative (bell pepper) [capsicum]; Cheese flavor; Chicken allergy; Claritin [loratadine]; Cow's milk [lac bovis]; Cvs yogurt + calcium [ca phosphate-cholecalciferol]; Dairy aid [lactase]; Eggs or egg-derived products; Food color orange [yellow dye]; Ginger; Gluten meal; Lambs quarters; Lima beans; Mustard seed; Other; Rye; Soybeans; and Wheat     Review of Systems   Constitutional: Negative for activity change, appetite change and fatigue.   HENT: Negative for congestion.    Respiratory: Negative for cough, chest tightness, shortness of breath and wheezing.    Cardiovascular: Positive for leg swelling (Right leg swelling for the past 3 weeks). Negative for chest pain.   Gastrointestinal: Negative for abdominal distention, abdominal pain, diarrhea, nausea and vomiting.   Endocrine: Negative for polyphagia and polyuria.   Genitourinary: Negative for frequency.   Musculoskeletal: Positive for arthralgias.   Skin: Negative for rash.   Neurological: Negative for light-headedness.   Hematological: Does not bruise/bleed easily.   Psychiatric/Behavioral: Negative for agitation and behavioral problems.       Vital  "Signs  Temp:  [97.1 °F (36.2 °C)-98.6 °F (37 °C)] 97.1 °F (36.2 °C)  Heart Rate:  [54-75] 64  Resp:  [16-18] 16  BP: (127-166)/() 127/77  Oxygen Therapy  SpO2: 99 %  Pulse Oximetry Type: Intermittent  Device (Oxygen Therapy): room air}  Body mass index is 31.54 kg/m².  Flowsheet Rows    Flowsheet Row First Filed Value   Admission Height 167.6 cm (66\") Documented at 2022 0942   Admission Weight 90.1 kg (198 lb 9.6 oz) Documented at 2022 0942                 Physical Exam  Vitals and nursing note reviewed.   Constitutional:       Appearance: She is well-developed. She is obese.   HENT:      Head: Normocephalic.   Eyes:      Conjunctiva/sclera: Conjunctivae normal.   Neck:      Thyroid: No thyromegaly.      Vascular: No JVD.   Cardiovascular:      Rate and Rhythm: Normal rate and regular rhythm.      Heart sounds: Normal heart sounds. No murmur heard.     Comments: No calf tenderness  Pulmonary:      Effort: Pulmonary effort is normal. No respiratory distress.      Breath sounds: Normal breath sounds. No wheezing or rales.   Abdominal:      General: Bowel sounds are normal. There is no distension.      Palpations: Abdomen is soft.      Tenderness: There is no abdominal tenderness. There is no guarding.   Musculoskeletal:      Right lower le+ Edema present.      Comments: Arthritic changes both knees   Skin:     General: Skin is warm and dry.      Findings: No rash.   Neurological:      General: No focal deficit present.      Mental Status: She is alert and oriented to person, place, and time.   Psychiatric:         Attention and Perception: Attention normal.         Mood and Affect: Mood normal.          Debilities/Disabilities Identified: None    Emotional Behavior: Appropriate    Result Review    Result Review:  I have personally reviewed the results from the time of this admission to 2022 07:42 EDT and agree with these findings:  [x]  Laboratory  []  Microbiology  [x]  Radiology  []  " EKG/Telemetry   []  Cardiology/Vascular   []  Pathology  [x]  Old records  []  Other:          Results Review:    I reviewed the patient's new clinical results.  Lab Results (most recent)     Procedure Component Value Units Date/Time    Troponin [[id number]]  (Normal) Collected: 08/12/22 0349    Specimen: Blood Updated: 08/12/22 0456     Troponin T <0.010 ng/mL     Narrative:      Troponin T Reference Range:  <= 0.03 ng/mL-   Negative for AMI  >0.03 ng/mL-     Abnormal for myocardial necrosis.  Clinicians would have to utilize clinical acumen, EKG, Troponin and serial changes to determine if it is an Acute Myocardial Infarction or myocardial injury due to an underlying chronic condition.       Results may be falsely decreased if patient taking Biotin.      Troponin [[id number]]  (Normal) Collected: 08/11/22 1820    Specimen: Blood Updated: 08/11/22 1844     Troponin T <0.010 ng/mL     Narrative:      Troponin T Reference Range:  <= 0.03 ng/mL-   Negative for AMI  >0.03 ng/mL-     Abnormal for myocardial necrosis.  Clinicians would have to utilize clinical acumen, EKG, Troponin and serial changes to determine if it is an Acute Myocardial Infarction or myocardial injury due to an underlying chronic condition.       Results may be falsely decreased if patient taking Biotin.      COVID PRE-OP / PRE-PROCEDURE SCREENING ORDER (NO ISOLATION) - Swab, Nasal Cavity [[id number]]  (Normal) Collected: 08/11/22 1412    Specimen: Swab from Nasal Cavity Updated: 08/11/22 1449    Narrative:      The following orders were created for panel order COVID PRE-OP / PRE-PROCEDURE SCREENING ORDER (NO ISOLATION) - Swab, Nasal Cavity.  Procedure                               Abnormality         Status                     ---------                               -----------         ------                     COVID-19,[facility] IN-NIKKI...[[id number]]  Normal              Final result                 Please view results for these tests on the  individual orders.    COVID-19,[facility] IN-HOUSE,Nasal Swab No Transport Media 3-4 HR TAT - Swab, Nasal Cavity [[id number]]  (Normal) Collected: 08/11/22 1412    Specimen: Swab from Nasal Cavity Updated: 08/11/22 1449     COVID19 Not Detected    Narrative:      Fact sheet for providers: https://www.fda.gov/media/319143/download     Fact sheet for patients: https://www.fda.gov/media/373900/download    Test performed by PCR.    Consider negative results in combination with clinical observations, patient history, and epidemiological information.    BNP [[id number]]  (Normal) Collected: 08/11/22 1225    Specimen: Blood Updated: 08/11/22 1311     proBNP 127.9 pg/mL     Narrative:      Among patients with dyspnea, NT-proBNP is highly sensitive for the detection of acute congestive heart failure. In addition NT-proBNP of <300 pg/ml effectively rules out acute congestive heart failure with 99% negative predictive value.    Results may be falsely decreased if patient taking Biotin.      Urinalysis, Microscopic Only - Urine, Clean Catch [[id number]]  (Abnormal) Collected: 08/11/22 1220    Specimen: Urine, Clean Catch Updated: 08/11/22 1245     RBC, UA 0-2 /HPF      WBC, UA 0-2 /HPF      Bacteria, UA None Seen /HPF      Squamous Epithelial Cells, UA 0-2 /HPF      Hyaline Casts, UA None Seen /LPF      Methodology Manual Light Microscopy    Urinalysis With Microscopic If Indicated (No Culture) - Urine, Clean Catch [[id number]]  (Abnormal) Collected: 08/11/22 1220    Specimen: Urine, Clean Catch Updated: 08/11/22 1235     Color, UA Straw     Appearance, UA Clear     pH, UA 7.0     Specific Gravity, UA 1.015     Glucose, UA Negative     Ketones, UA Negative     Bilirubin, UA Negative     Blood, UA Trace     Protein, UA Negative     Leuk Esterase, UA Trace     Nitrite, UA Negative     Urobilinogen, UA 0.2 E.U./dL    D-dimer, Quantitative [[id number]]  (Abnormal) Collected: 08/11/22 1003    Specimen: Blood Updated: 08/11/22 1024      D-Dimer, Quantitative 1.04 MCGFEU/mL     Narrative:      Can be elevated in, but is not diagnostic for deep vein thrombosis (DVT) or pulmonary embolis (PE).  It is also elevated in other medical conditions.  Clinical correlation is required.  The negative cut-off value for the D-Dimer is 0.50 mcg FEU/mL for DVT and PE.      Comprehensive Metabolic Panel [[id number]]  (Abnormal) Collected: 08/11/22 0950    Specimen: Blood Updated: 08/11/22 1017     Glucose 100 mg/dL      BUN 14 mg/dL      Creatinine 0.95 mg/dL      Sodium 136 mmol/L      Potassium 3.9 mmol/L      Chloride 100 mmol/L      CO2 27.6 mmol/L      Calcium 9.4 mg/dL      Total Protein 7.8 g/dL      Albumin 4.30 g/dL      ALT (SGPT) 18 U/L      AST (SGOT) 27 U/L      Alkaline Phosphatase 103 U/L      Total Bilirubin 0.3 mg/dL      Globulin 3.5 gm/dL      A/G Ratio 1.2 g/dL      BUN/Creatinine Ratio 14.7     Anion Gap 8.4 mmol/L      eGFR 67.5 mL/min/1.73      Comment: National Kidney Foundation and American Society of Nephrology (ASN) Task Force recommended calculation based on the Chronic Kidney Disease Epidemiology Collaboration (CKD-EPI) equation refit without adjustment for race.       Narrative:      GFR Normal >60  Chronic Kidney Disease <60  Kidney Failure <15      CBC & Differential [[id number]]  (Abnormal) Collected: 08/11/22 0950    Specimen: Blood Updated: 08/11/22 0956    Narrative:      The following orders were created for panel order CBC & Differential.  Procedure                               Abnormality         Status                     ---------                               -----------         ------                     CBC Auto Differential[[id number]]        Abnormal            Final result                 Please view results for these tests on the individual orders.    CBC Auto Differential [[id number]]  (Abnormal) Collected: 08/11/22 0950    Specimen: Blood Updated: 08/11/22 0956     WBC 3.93 10*3/mm3      RBC 4.76 10*6/mm3       Hemoglobin 12.6 g/dL      Hematocrit 40.0 %      MCV 84.0 fL      MCH 26.5 pg      MCHC 31.5 g/dL      RDW 15.5 %      RDW-SD 48.5 fl      MPV 9.7 fL      Platelets 199 10*3/mm3      Neutrophil % 41.7 %      Lymphocyte % 44.3 %      Monocyte % 9.9 %      Eosinophil % 3.1 %      Basophil % 1.0 %      Immature Grans % 0.0 %      Neutrophils, Absolute 1.64 10*3/mm3      Lymphocytes, Absolute 1.74 10*3/mm3      Monocytes, Absolute 0.39 10*3/mm3      Eosinophils, Absolute 0.12 10*3/mm3      Basophils, Absolute 0.04 10*3/mm3      Immature Grans, Absolute 0.00 10*3/mm3           Imaging Results (Most Recent)     Procedure Component Value Units Date/Time    CT Angiogram Chest [[id number]] Collected: 08/11/22 1225     Updated: 08/11/22 1227    Narrative:      CTA Chest    INDICATION:   Chest pain, chest tightness/pressure since this morning, worsened with deep inspiration, elevated d-dimer    TECHNIQUE:   CT angiogram of the chest with IV contrast. 3-D reconstructions were obtained and reviewed.   Radiation dose reduction techniques included automated exposure control or exposure modulation based on body size. Count of known CT and cardiac nuc med studies  performed in previous 12 months: 0.     COMPARISON:   Same-day chest radiograph    FINDINGS:   Adequate opacification of the pulmonary arteries with no filling defects. Normal size thoracic aorta. Mild cardiomegaly. No pericardial effusion.    There are predominantly peribronchovascular groundglass opacities throughout the lungs, favoring the mid to lower lung zones. Findings favor that of edema though a low-grade infectious or inflammatory process could have an identical appearance. There is  no pleural effusion or pneumothorax. A subpleural pulmonary nodule in the right upper lobe measures up to 7 mm.    Visualized upper abdomen is without acute abnormality allowing for limited field-of-view and contrast bolus timing.    No acute osseous abnormality.      Impression:       1. Negative for pulmonary embolus.  2. There are predominantly peribronchovascular groundglass opacities throughout the lungs, favoring the mid to lower lung zones. Findings favor that of edema though an infectious or inflammatory process could have an identical appearance. There is no  pleural effusion or pneumothorax.   3. A subpleural pulmonary nodule in the right upper lobe measures up to 7 mm. As per 2017 Fleischner Society criteria recommendation is for follow-up CT Chest in 6-12 months for reassessment.  4. Mild cardiomegaly.    Signer Name: CONNOR FRAZIER MD   Signed: 8/11/2022 12:25 PM   Workstation Name: [id number]    [facility]    XR Chest 1 View [[id number]] Collected: 08/11/22 1036     Updated: 08/11/22 1039    Narrative:      CHEST X-RAY, 08/11/2022         HISTORY:  61-year-old female in the ED noting midsternal chest pain this morning.     TECHNIQUE:  AP portable chest x-ray.     COMPARISON:  *  Chest x-ray, 11/21/2021.     FINDINGS:  Heart size and pulmonary vascularity are within normal limits. The lungs  appear clear. No visible pulmonary infiltrate or pleural effusion.       Impression:      Negative chest.     This report was finalized on 8/11/2022 10:37 AM by Dr. Don Leach MD.           reviewed    ECG/EMG Results (most recent)     Procedure Component Value Units Date/Time    ECG 12 Lead [[id number]] Collected: 08/11/22 1218     Updated: 08/11/22 1903     QT Interval 429 ms     Narrative:      HEART RATE= 58  bpm  RR Interval= 1036  ms  ME Interval= 226  ms  P Horizontal Axis= 41  deg  P Front Axis= 31  deg  QRSD Interval= 82  ms  QT Interval= 429  ms  QRS Axis= -6  deg  T Wave Axis= 55  deg  - ABNORMAL ECG -  Sinus rhythm  Prolonged ME interval - new  Consider anterior infarct  Electronically Signed By: Sandy Ro ([facility]) 11-Aug-2022 19:03:35  Date and Time of Study: 2022-08-11 12:18:40    ECG 12 Lead [[id number]] Collected: 08/11/22 0945     Updated: 08/11/22 1904      QT Interval 391 ms     Narrative:      HEART RATE= 69  bpm  RR Interval= 864  ms  LA Interval= 195  ms  P Horizontal Axis= 19  deg  P Front Axis= 29  deg  QRSD Interval= 75  ms  QT Interval= 391  ms  QRS Axis= 3  deg  T Wave Axis= 45  deg  - BORDERLINE ECG -  Sinus rhythm  Consider anterior infarct  NO SIGNIFICANT CHANGE FROM PREVIOUS ECG  Electronically Signed By: Sandy Ro ([facility]) 11-Aug-2022 19:03:43  Date and Time of Study: 2022-08-11 09:45:54        reviewed    Assessment & Plan       1.  Chest pain with multiple risk factors will admit rule out ACS stress Cardiolite in a.m.    2.  Elevated D-dimer CTA chest negative for PE but has groundglass opacities bilaterally her COVID is negative she denies any shortness of breath cough or chest congestion.  Her BNP is normal.  We will get pulmonary to evaluate the patient    3.  Hypothyroidism nothing acute continue home medications    4.  Hyperlipidemia stable nothing acute continue with home statin      5.  Hypertension controlled medications we will continue to monitor    6.  Multijoint osteoarthritis stable    7.  DVT prophylaxis Lovenox      I discussed the patients findings and my recommendations with patient     Johnie Wood MD  08/12/22  07:42 EDT        Much of this encounter note is an electronic transcription/translation of spoken language to printed text. The electronic translation of spoken language may permit erroneous, or at times, nonsensical words or phrases to be inadvertently transcribed; Although I have reviewed the note for such errors, some may still exist.    As of April 2021, as required by the Federal 21st Century Cures Act, medical records (including provider notes and laboratory/imaging results) are to be made available to patients and/or their designees as soon as the documents are signed/resulted. While the intention is to ensure transparency and to engage patients in their healthcare, this immediate access may create  unintended consequences because this document uses language intended for communication between medical providers for interpretation with the entirety of the patient's clinical picture in mind. It is recommended that patients and/or their designees review all available information with their primary or specialist providers for explanation and to avoid misinterpretation of this information

## 2022-08-12 NOTE — CONSULTS
[facility]  [phone]  Dr. Nelson Sanchez      Subjective   LOS: 0 days     Thank for this consultation.  Asked to this 61-year-old female for abnormal chest CT.  Patient is a lifelong non-smoker.  She came into the hospital because of acute onset chest pain which started Thursday morning.  Pain persisted until she came to the ED and was given 3 tabs of nitro.  She received 3 additional tabs of nitro on the unit.  Currently the central chest pain is trace to mild.  She denies any GI symptoms.  No acid reflux and denies dysphagia.  No previous history of stroke.  She has no prior cardiac history.  She denies any shortness of breath.  Outside of the chest discomfort made worse when she takes a deep breath she had 0 shortness of breath.  She used to work as an .  No environmental exposures.  As stated never smoker.  Otherwise mostly unremarkable medical history.    Opla Ardon  reports previous alcohol use.,  reports that she has never smoked. She has never used smokeless tobacco.     Past Hx:  has a past medical history of Bowel obstruction (HCC) (2016), Colon polyp, Disease of thyroid gland, Heart murmur, Hyperlipidemia, and PONV (postoperative nausea and vomiting).  Surg Hx:  has a past surgical history that includes Appendectomy; Colonoscopy (N/A, 11/28/2016); Ovarian cyst surgery; Colon surgery (2016); and Chest Wall Mass Excision (Right, 10/17/2018).  FH: family history includes Alzheimer's disease in her mother; Colon polyps in her sister; Heart disease in her father; Hypertension in her brother and mother; Prostate cancer in her brother and father; Stroke in her mother.  SH:  reports that she has never smoked. She has never used smokeless tobacco. She reports previous alcohol use. She reports that she does not use drugs.    Medications Prior to Admission   Medication Sig Dispense Refill Last Dose   • rosuvastatin (CRESTOR) 10 MG tablet Take 10 mg by mouth Daily.   8/10/2022 at  1000   • Synthroid 100 MCG tablet Take 100 mcg by mouth Every Morning.   8/11/2022 at 0500   • diphenhydrAMINE (Benadryl Allergy) 25 MG tablet Take 25 mg by mouth Every 6 (Six) Hours As Needed for Itching (takes 1-2 when eats something and developes a rash).      • lisinopril-hydrochlorothiazide (PRINZIDE,ZESTORETIC) 20-12.5 MG per tablet Take 1 tablet by mouth Daily. Stopped taking recently      • vitamin D (ERGOCALCIFEROL) 1.25 MG (44562 UT) capsule capsule Take 50,000 Units by mouth 1 (One) Time Per Week. On SUnday's 8/7/2022 at 1000     Allergies   Allergen Reactions   • Antihistamines, Diphenhydramine-Type Hives   • Capsicum Annuum Extract & Derivative (Bell Pepper) [Capsicum] Hives   • Cheese Flavor Hives     mozerella   • Chicken Allergy Hives   • Claritin [Loratadine] Hives   • Cow's Milk [Lac Bovis] Hives   • Cvs Yogurt + Calcium [Ca Phosphate-Cholecalciferol] Hives   • Dairy Aid [Lactase] Hives   • Eggs Or Egg-Derived Products Hives   • Food Color Orange [Yellow Dye] Hives   • Ginger Hives   • Gluten Meal Hives     Pt is gluten free   • Lambs Quarters Hives     Rash, hives   • [place] Beans Hives   • Mustard Seed Hives   • Other Hives     Pt states all Antihistamines cause hives   • Rye Hives   • Soybeans Hives   • Wheat Hives       Review of Systems   Constitutional: Negative for chills and fever.   HENT: Negative for congestion and sore throat.    Respiratory: Negative for cough, shortness of breath and wheezing.    Cardiovascular: Positive for chest pain. Negative for leg swelling.   Gastrointestinal: Negative for abdominal pain, nausea and vomiting.   Genitourinary: Negative for dysuria and hematuria.   Musculoskeletal: Negative for arthralgias and back pain.   Skin: Negative for pallor and rash.   Neurological: Negative for seizures and headaches.   Psychiatric/Behavioral: Negative for agitation and confusion.     Vital Signs past 24hrs  BP range: BP: (127-166)/() 127/77  Pulse range: Heart Rate:   [54-75] 64  Resp rate range: Resp:  [16-18] 16  Temp range: Temp (24hrs), Av °F (36.7 °C), Min:97.1 °F (36.2 °C), Max:98.6 °F (37 °C)    Oxygen range: SpO2:  [98 %-100 %] 99 %;  ;   Device (Oxygen Therapy): room air  88.6 kg (195 lb 6.4 oz); Body mass index is 31.54 kg/m².  I/O this shift:  In: -   Out: 800 [Urine:800]    Adult female who is sitting up in bed.  No acute distress.  Pupils equal and react to light and commendation.  Oropharynx class III Mallampati airway.  No posterior pharyngeal discharge.  Nasopharynx without discharge septum midline.  JVP not elevated trachea midline thyroid not enlarged.  Lungs reveal bilateral air entry.  Clear to auscultation no rales rhonchi wheeze.  Percussion note resonant chest expansion equal no chest wall deformity or tenderness.  Heart examination S1-S2 present rhythm regular no murmurs.  No edema lower extremities.  Abdomen is soft nontender bowel sounds present no liver spleen enlargement.  No peripheral cyanosis clubbing.  Moves all 4 extremities sensorimotor intact.  No cervical, axillary, inguinal adenopathy.    Results Review:    I have reviewed the laboratory and imaging data from current admission. My annotations are as noted in assessment and plan.    Medication Review:  I have reviewed the current MAR. My annotations are as noted in assessment and plan.    Plan   PCCM Problems  Acute onset chest pain now resolved  7 mm RUL nodule  GGO infiltrates  Obesity  Never smoker        Plan of Treatment  Chest pain does not appear to be of pulmonary etiology.  Note pending stress test.    Right upper lobe lung nodule requires follow-up CT in 6 months.    GGO infiltrates uncertain etiology.  Could be from mild pulmonary edema if indeed a cardiac issue.  Patient does not report any infective symptoms.  Will review on follow-up chest CT imaging.    I will have my office call her to schedule CT chest and thereafter follow-up appointment with us.    Electronically signed  by Nelson Sanchez MD, 08/12/22, 8:54 AM EDT.      Part of this note may be an electronic transcription/translation of spoken language to printed text using the Dragon Dictation System.

## 2022-08-13 ENCOUNTER — READMISSION MANAGEMENT (OUTPATIENT)
Dept: CALL CENTER | Facility: HOSPITAL | Age: 62
End: 2022-08-13

## 2022-08-13 NOTE — OUTREACH NOTE
Prep Survey    Flowsheet Row Responses   [facility] patient discharged from? [facility]   Is LACE score < 7 ? Yes   Emergency Room discharge w/ pulse ox? No   Eligibility Readm Mgmt   Discharge diagnosis Chest pain with multiple risk factors will admit rule out ACS stress Cardiolite in a.m.   Does the patient have one of the following disease processes/diagnoses(primary or secondary)? Other   Does the patient have Home health ordered? No   Is there a DME ordered? No   Prep survey completed? Yes          DEN SIM - Registered Nurse

## 2022-08-15 NOTE — DISCHARGE SUMMARY
Opal Ardon  1960  [id number]        Discharge Summary    Date of Admission: 8/11/2022  Date of Discharge: 8/12/2022    Primary Discharge Diagnoses:     Noncardiac chest pain    Secondary Discharge Diagnoses:     Hypertension diet-controlled  Hyperlipidemia  Hypothyroidism  Right lower extremity swelling DVT ruled out    PCP  Patient Care Team:  Johnie Wood MD as PCP - General (Family Medicine)    Consults:   Consults     Date and Time Order Name Status Description    8/12/2022  8:03 AM Inpatient Pulmonology Consult Completed             History of Present Illness:  58-year-old -American female with history hypertension hypercholesterolemia hypothyroidism presented emergency room with sudden onset of substernal chest pain felt like a pressure sensation lasted for over half an hour She came to emergency room was given some nitroglycerin with some improvement.  Denies any nausea vomiting dyspepsia shortness of breath or diaphoresis with chest pain.  She has never had any pain before.  She denies any dyspepsia or reflux problems    Hospital Course    Patient admitted to telemetry bed.  She had some more chest pain after admission was relieved with nitroglycerin she had no further pain after this.  She had a normal stress Cardiolite study.  She had some right lower extremity swelling which was apparently last 2 to 3 weeks.  Doppler was negative etiology nilam uncertain she is.  She is to keep it elevated use compression stocking after discharge.      Operations and Procedures Performed:       XR Chest 1 View    Result Date: 8/11/2022  Narrative: CHEST X-RAY, 08/11/2022     HISTORY: 61-year-old female in the ED noting midsternal chest pain this morning.  TECHNIQUE: AP portable chest x-ray.  COMPARISON: *  Chest x-ray, 11/21/2021.  FINDINGS: Heart size and pulmonary vascularity are within normal limits. The lungs appear clear. No visible pulmonary infiltrate or pleural effusion.       Impression: Negative chest.  This report was finalized on 8/11/2022 10:37 AM by Dr. Don Leach MD.      Stress Test With Myocardial Perfusion One Day    Result Date: 8/12/2022  Narrative: · Myocardial perfusion imaging indicates a normal myocardial perfusion study with no evidence of ischemia. · Findings consistent with a normal ECG stress test. · Left ventricular ejection fraction is normal. (Calculated EF = 53%). · Impressions are consistent with a low risk study.      US Venous Doppler Lower Extremity Right (duplex)    Result Date: 8/12/2022  Narrative: VENOUS DOPPLER ULTRASOUND, RIGHT LOWER EXTREMITY, 08/12/2022  HISTORY: 61-year-old female with 2-3 week history right ankle pain and edema/swelling.  TECHNIQUE: Venous Doppler ultrasound examination of the right leg was performed using grey-scale, spectral Doppler, and color flow Doppler ultrasound imaging. Protocol includes popliteal and deep calf veins and the great saphenous vein.  FINDINGS: The examination is negative.  There is no evidence of deep venous thrombosis from the groin to the lower calf. The greater saphenous vein is also patent.      Impression: Negative examination.  No evidence of right lower extremity DVT.  This report was finalized on 8/12/2022 11:51 AM by Dr. Don Leach MD.      CT Angiogram Chest    Result Date: 8/11/2022  Narrative: CTA Chest INDICATION: Chest pain, chest tightness/pressure since this morning, worsened with deep inspiration, elevated d-dimer TECHNIQUE: CT angiogram of the chest with IV contrast. 3-D reconstructions were obtained and reviewed.   Radiation dose reduction techniques included automated exposure control or exposure modulation based on body size. Count of known CT and cardiac nuc med studies performed in previous 12 months: 0. COMPARISON: Same-day chest radiograph FINDINGS: Adequate opacification of the pulmonary arteries with no filling defects. Normal size thoracic aorta. Mild cardiomegaly. No  pericardial effusion. There are predominantly peribronchovascular groundglass opacities throughout the lungs, favoring the mid to lower lung zones. Findings favor that of edema though a low-grade infectious or inflammatory process could have an identical appearance. There is no pleural effusion or pneumothorax. A subpleural pulmonary nodule in the right upper lobe measures up to 7 mm. Visualized upper abdomen is without acute abnormality allowing for limited field-of-view and contrast bolus timing. No acute osseous abnormality.     Impression: 1. Negative for pulmonary embolus. 2. There are predominantly peribronchovascular groundglass opacities throughout the lungs, favoring the mid to lower lung zones. Findings favor that of edema though an infectious or inflammatory process could have an identical appearance. There is no pleural effusion or pneumothorax. 3. A subpleural pulmonary nodule in the right upper lobe measures up to 7 mm. As per 2017 Fleischner Society criteria recommendation is for follow-up CT Chest in 6-12 months for reassessment. 4. Mild cardiomegaly. Signer Name: CONNOR FRAZIER MD  Signed: 8/11/2022 12:25 PM  Workstation Name: [id number]  [facility]      Labs:  Results from last 7 days   Lab Units 08/11/22  0950   WBC 10*3/mm3 3.93   HEMOGLOBIN g/dL 12.6   HEMATOCRIT % 40.0   PLATELETS 10*3/mm3 199     Results from last 7 days   Lab Units 08/11/22  0950   SODIUM mmol/L 136   POTASSIUM mmol/L 3.9   CHLORIDE mmol/L 100   CO2 mmol/L 27.6   BUN mg/dL 14   CREATININE mg/dL 0.95   CALCIUM mg/dL 9.4   BILIRUBIN mg/dL 0.3   ALK PHOS U/L 103   ALT (SGPT) U/L 18   AST (SGOT) U/L 27   GLUCOSE mg/dL 100*           Lab Results (last 24 hours)     Procedure Component Value Units Date/Time    Troponin [[id number]]  (Normal) Collected: 08/12/22 0349    Specimen: Blood Updated: 08/12/22 0456     Troponin T <0.010 ng/mL     Narrative:      Troponin T Reference Range:  <= 0.03 ng/mL-   Negative for  AMI  >0.03 ng/mL-     Abnormal for myocardial necrosis.  Clinicians would have to utilize clinical acumen, EKG, Troponin and serial changes to determine if it is an Acute Myocardial Infarction or myocardial injury due to an underlying chronic condition.       Results may be falsely decreased if patient taking Biotin.      Troponin [[id number]]  (Normal) Collected: 08/11/22 1820    Specimen: Blood Updated: 08/11/22 1844     Troponin T <0.010 ng/mL     Narrative:      Troponin T Reference Range:  <= 0.03 ng/mL-   Negative for AMI  >0.03 ng/mL-     Abnormal for myocardial necrosis.  Clinicians would have to utilize clinical acumen, EKG, Troponin and serial changes to determine if it is an Acute Myocardial Infarction or myocardial injury due to an underlying chronic condition.       Results may be falsely decreased if patient taking Biotin.          Results from last 7 days   Lab Units 08/12/22  0349 08/11/22  1820 08/11/22  1225 08/11/22  1003   TROPONIN T ng/mL <0.010 <0.010 <0.010  --    D DIMER QUANT MCGFEU/mL  --   --   --  1.04*         Results from last 7 days   Lab Units 08/11/22  1225   PROBNP pg/mL 127.9                   Invalid input(s): LDLCALC          No results found for: POCGLU          Results from last 7 days   Lab Units 08/11/22  1220   NITRITE UA  Negative   WBC UA /HPF 0-2*   BACTERIA UA /HPF None Seen   SQUAM EPITHEL UA /HPF 0-2             Radiology:  Imaging Results (Last 24 Hours)     Procedure Component Value Units Date/Time    US Venous Doppler Lower Extremity Right (duplex) [[id number]] Collected: 08/12/22 1151     Updated: 08/12/22 1153    Narrative:      VENOUS DOPPLER ULTRASOUND, RIGHT LOWER EXTREMITY, 08/12/2022     HISTORY:   61-year-old female with 2-3 week history right ankle pain and  edema/swelling.     TECHNIQUE:   Venous Doppler ultrasound examination of the right leg was performed  using grey-scale, spectral Doppler, and color flow Doppler ultrasound  imaging. Protocol includes  popliteal and deep calf veins and the great  saphenous vein.     FINDINGS:   The examination is negative.  There is no evidence of deep venous  thrombosis from the groin to the lower calf. The greater saphenous vein  is also patent.       Impression:      Negative examination.  No evidence of right lower extremity DVT.     This report was finalized on 8/12/2022 11:51 AM by Dr. Don Leach MD.             PROCEDURES          Allergies:  is allergic to antihistamines, diphenhydramine-type; capsicum annuum extract & derivative (bell pepper) [capsicum]; cheese flavor; chicken allergy; claritin [loratadine]; cow's milk [lac bovis]; cvs yogurt + calcium [ca phosphate-cholecalciferol]; dairy aid [lactase]; eggs or egg-derived products; food color orange [yellow dye]; ginger; gluten meal; lambs quarters; lima beans; mustard seed; other; rye; soybeans; and wheat.      Discharge Medications:     Your medication list      CONTINUE taking these medications      Instructions Last Dose Given Next Dose Due   Benadryl Allergy 25 MG tablet  Generic drug: diphenhydrAMINE      Take 25 mg by mouth Every 6 (Six) Hours As Needed for Itching (takes 1-2 when eats something and developes a rash).       rosuvastatin 10 MG tablet  Commonly known as: CRESTOR      Take 10 mg by mouth Daily.       Synthroid 100 MCG tablet  Generic drug: levothyroxine      Take 100 mcg by mouth Every Morning.       vitamin D 1.25 MG (21162 UT) capsule capsule  Commonly known as: ERGOCALCIFEROL      Take 50,000 Units by mouth 1 (One) Time Per Week. On SUnday's          STOP taking these medications    lisinopril-hydrochlorothiazide 20-12.5 MG per tablet  Commonly known as: PRINZIDE,ZESTORETIC               Home medications and discharge medications reconciled with patient      Condition on Discharge: Stable    Discharge Disposition  Home    Visiting Nurse:    No     Home PT/OT:  No     Home Safety Evaluation:  No     DME  None    Discharge Diet: Cardiac        Activity at Discharge:  As tolerated      Follow-up Appointments:  Additional Instructions for the Follow-ups that You Need to Schedule     Call MD for problems / concerns.   As directed         Follow-up Information     Johnie Wood MD .    Specialty: Family Medicine  Contact information:  [place] [id number] [phone]                         Test Results Pending at Discharge       Johnie Wood MD  08/15/22  07:57 EDT          Much of this encounter note is an electronic transcription/translation of spoken language to printed text. The electronic translation of spoken language may permit erroneous, or at times, nonsensical words or phrases to be inadvertently transcribed; Although I have reviewed the note for such errors, some may still exist.    As of April 2021, as required by the Federal 21st Century Cures Act, medical records (including provider notes and laboratory/imaging results) are to be made available to patients and/or their designees as soon as the documents are signed/resulted. While the intention is to ensure transparency and to engage patients in their healthcare, this immediate access may create unintended consequences because this document uses language intended for communication between medical providers for interpretation with the entirety of the patient's clinical picture in mind. It is recommended that patients and/or their designees review all available information with their primary or specialist providers for explanation and to avoid misinterpretation of this information

## 2022-08-16 ENCOUNTER — HOSPITAL ENCOUNTER (EMERGENCY)
Facility: HOSPITAL | Age: 62
Discharge: HOME | End: 2022-08-16
Attending: EMERGENCY MEDICINE | Admitting: EMERGENCY MEDICINE

## 2022-08-16 ENCOUNTER — READMISSION MANAGEMENT (OUTPATIENT)
Dept: CALL CENTER | Facility: HOSPITAL | Age: 62
End: 2022-08-16

## 2022-08-16 VITALS
TEMPERATURE: 99.2 F | WEIGHT: 198 LBS | RESPIRATION RATE: 17 BRPM | HEART RATE: 70 BPM | BODY MASS INDEX: 31.82 KG/M2 | DIASTOLIC BLOOD PRESSURE: 82 MMHG | HEIGHT: 66 IN | OXYGEN SATURATION: 100 % | SYSTOLIC BLOOD PRESSURE: 137 MMHG

## 2022-08-16 DIAGNOSIS — M62.838 MUSCLE SPASMS OF NECK: Primary | ICD-10-CM

## 2022-08-16 PROCEDURE — 93010 ELECTROCARDIOGRAM REPORT: CPT | Performed by: INTERNAL MEDICINE

## 2022-08-16 PROCEDURE — 99282 EMERGENCY DEPT VISIT SF MDM: CPT

## 2022-08-16 PROCEDURE — 93005 ELECTROCARDIOGRAM TRACING: CPT | Performed by: EMERGENCY MEDICINE

## 2022-08-16 NOTE — OUTREACH NOTE
[facility] < 7 Survey    Flowsheet Row Responses   [facility] patient discharged from? [facility]   Does the patient have one of the following disease processes/diagnoses(primary or secondary)? Other   [facility] <7 Attempt successful? Yes   Call start time 1204   Call end time 1208   Discharge diagnosis Noncardiac chest pain   Person spoke with today (if not patient) and relationship patient   Meds reviewed with patient/caregiver? Yes   Does the patient have all medications ordered at discharge? N/A  [No new meds ordered at discharge. ]   Is the patient taking all medications as directed (includes completed medication regime)? Yes   Does the patient have a primary care provider?  Yes   Does the patient have an appointment with their PCP within 7 days of discharge? Yes   Comments regarding PCP Johnie Wood MD PCP appt friday 8/19   Has the patient kept scheduled appointments due by today? Yes   Has home health visited the patient within 72 hours of discharge? N/A   Psychosocial issues? No   Did the patient receive a copy of their discharge instructions? Yes   Nursing interventions Reviewed instructions with patient   What is the patient's perception of their health status since discharge? Improving   Is the patient/caregiver able to teach back signs and symptoms related to disease process for when to call PCP? Yes   Is the patient/caregiver able to teach back signs and symptoms related to disease process for when to call 911? Yes   Is the patient/caregiver able to teach back the hierarchy of who to call/visit for symptoms/problems? PCP, Specialist, Home health nurse, Urgent Care, ED, 911 Yes   If the patient is a current smoker, are they able to teach back resources for cessation? Not a smoker   Graduated Yes   Is the patient interested in additional calls from an ambulatory ?  NOTE:  applies to high risk patients requiring additional follow-up. No          BENNY HALL - Registered Nurse

## 2022-08-17 LAB — QT INTERVAL: 389 MS

## 2022-08-17 NOTE — ED PROVIDER NOTES
Subjective     History provided by:  Patient and medical records    History of Present Illness    · Chief complaint: Neck pain    · Location: Currently on the right side of the neck but it alternates to the left side of the neck.    · Quality/Severity: The patient states she has a tightness on the right side of the neck.  She has had tightness recently on the left side of the neck.    · Timing/Onset: Started about 1:59 PM this afternoon.    · Modifying Factors: Turning her neck exacerbates the tightness.    · Associated symptoms: No numbness or tingling in her upper extremities.  No weakness of her upper extremities.    · Narrative: The patient is a 61-year-old -American female complaining of tightness currently on the right side of her neck but it was on the left side of her neck earlier today.  The patient was just admitted to this hospital 8/11-12/2022 for noncardiac chest pain.  She had a normal stress Cardiolite at that time she also had a CT angiogram of the chest that showed no pulmonary embolus but groundglass opacities in both lungs that was thought to be edema.    Review of Systems   Constitutional: Negative for activity change, appetite change, chills, diaphoresis, fatigue and fever.   HENT: Negative for congestion, dental problem, ear pain, hearing loss, mouth sores, postnasal drip, rhinorrhea, sinus pressure, sore throat and voice change.    Eyes: Negative for photophobia, pain, discharge, redness and visual disturbance.   Respiratory: Negative for cough, chest tightness, shortness of breath, wheezing and stridor.    Cardiovascular: Negative for chest pain, palpitations and leg swelling.   Gastrointestinal: Negative for abdominal pain, diarrhea, nausea and vomiting.   Genitourinary: Negative for difficulty urinating, dysuria, flank pain, frequency, hematuria and urgency.   Musculoskeletal: Positive for neck pain. Negative for arthralgias, back pain, gait problem, joint swelling, myalgias and neck  "stiffness.   Skin: Negative for color change and rash.   Neurological: Negative for dizziness, tremors, seizures, syncope, facial asymmetry, speech difficulty, weakness, light-headedness, numbness and headaches.   Hematological: Negative for adenopathy.   Psychiatric/Behavioral: Negative.  Negative for confusion and decreased concentration. The patient is not nervous/anxious.      Past Medical History:   Diagnosis Date   • Bowel obstruction (HCC) 2016    h/o   • Colon polyp    • Disease of thyroid gland     hypothyroidism   • Heart murmur    • Hyperlipidemia    • PONV (postoperative nausea and vomiting)     had after w/pain pill, not sure what type     /82 (BP Location: Right arm, Patient Position: Sitting)   Pulse 70   Temp 99.2 °F (37.3 °C) (Oral)   Resp 17   Ht 167.6 cm (66\")   Wt 89.8 kg (198 lb)   SpO2 100%   BMI 31.96 kg/m²     Past Medical History:   Diagnosis Date   • Bowel obstruction (HCC) 2016    h/o   • Colon polyp    • Disease of thyroid gland     hypothyroidism   • Heart murmur    • Hyperlipidemia    • PONV (postoperative nausea and vomiting)     had after w/pain pill, not sure what type       Allergies   Allergen Reactions   • Antihistamines, Diphenhydramine-Type Hives   • Capsicum Annuum Extract & Derivative (Bell Pepper) [Capsicum] Hives   • Cheese Flavor Hives     mozerella   • Chicken Allergy Hives   • Claritin [Loratadine] Hives   • Cow's Milk [Lac Bovis] Hives   • Cvs Yogurt + Calcium [Ca Phosphate-Cholecalciferol] Hives   • Dairy Aid [Lactase] Hives   • Eggs Or Egg-Derived Products Hives   • Food Color Orange [Yellow Dye] Hives   • Carmen Hives   • Gluten Meal Hives     Pt is gluten free   • Lambs Quarters Hives     Rash, hives   • [place] Beans Hives   • Mustard Seed Hives   • Other Hives     Pt states all Antihistamines cause hives   • Rye Hives   • Soybeans Hives   • Wheat Hives       Past Surgical History:   Procedure Laterality Date   • APPENDECTOMY     • CHEST WALL MASS EXCISION " Right 10/17/2018    Procedure: EXCISION OF RIGHT CHEST WALL MASS;  Surgeon: Tim Palencia MD;  Location:  [facility];  Service: General   • COLON SURGERY  2016    states piece removed d/t obstruction/scar tissue   • COLONOSCOPY N/A 11/28/2016    Procedure: COLONOSCOPY with polypectomy;  Surgeon: Kristy Caballero MD;  Location:  [facility] OR;  Service:    • OVARIAN CYST SURGERY      x2       Family History   Problem Relation Age of Onset   • Stroke Mother    • Hypertension Mother    • Alzheimer's disease Mother    • Prostate cancer Father    • Heart disease Father         pacemaker   • Colon polyps Sister    • Hypertension Brother    • Prostate cancer Brother    • Malig Hyperthermia Neg Hx    • Breast cancer Neg Hx        Social History     Socioeconomic History   • Marital status:    Tobacco Use   • Smoking status: Never Smoker   • Smokeless tobacco: Never Used   Vaping Use   • Vaping Use: Never used   Substance and Sexual Activity   • Alcohol use: Not Currently     Comment: rarely   • Drug use: No   • Sexual activity: Defer           Objective   Physical Exam  Vitals and nursing note reviewed.   Constitutional:       General: She is not in acute distress.     Appearance: Normal appearance. She is well-developed. She is not ill-appearing, toxic-appearing or diaphoretic.   HENT:      Head: Normocephalic and atraumatic.      Nose: Nose normal.      Mouth/Throat:      Mouth: Mucous membranes are moist.      Pharynx: Oropharynx is clear.   Eyes:      General: No scleral icterus.        Right eye: No discharge.         Left eye: No discharge.      Pupils: Pupils are equal, round, and reactive to light.   Neck:      Thyroid: No thyromegaly.      Vascular: No JVD.      Comments: The patient points to the musculature of the right side of the neck is tight.  She has no posterior cervical spine tenderness or deformity.  Cardiovascular:      Rate and Rhythm: Normal rate and regular rhythm.      Heart sounds: Normal heart  sounds. No murmur heard.  Pulmonary:      Effort: Pulmonary effort is normal.      Breath sounds: Normal breath sounds. No wheezing, rhonchi or rales.   Chest:      Chest wall: No tenderness.   Abdominal:      General: Bowel sounds are normal. There is no distension.      Palpations: Abdomen is soft.      Tenderness: There is no abdominal tenderness.   Musculoskeletal:         General: No tenderness or deformity. Normal range of motion.      Cervical back: Normal range of motion and neck supple. No tenderness.   Lymphadenopathy:      Cervical: No cervical adenopathy.   Skin:     General: Skin is warm and dry.      Capillary Refill: Capillary refill takes less than 2 seconds.      Coloration: Skin is not pale.      Findings: No erythema or rash.   Neurological:      General: No focal deficit present.      Mental Status: She is alert and oriented to person, place, and time.      Cranial Nerves: No cranial nerve deficit.      Coordination: Coordination normal.      Comments: No focal motor sensory deficit   Psychiatric:         Mood and Affect: Mood normal.         Behavior: Behavior normal.         Thought Content: Thought content normal.         Judgment: Judgment normal.         Procedures           ED Course  ED Course as of 08/17/22 0059   Tue Aug 16, 2022   2140 My interpretation of the patient's EKG tracing performed 21: 18 is normal sinus rhythm with a rate of 71, normal axis, normal conduction, no acute ST segment elevation or depression consistent with ischemia, no ectopy, normal DE and QT interval, normal R wave transition.  Normal EKG. [TP]   Wed Aug 17, 2022   0058 Is my impression the patient's tightness on the right side of her neck is due to muscle spasm.  She was given reassurance and instructed to apply heat to her neck.  She is to follow-up with her PCP. [TP]      ED Course User Index  [TP] Tim Armenta MD                                           MDM  Number of Diagnoses or Management  Options  Muscle spasms of neck: new and does not require workup     Amount and/or Complexity of Data Reviewed  Tests in the medicine section of CPT®: ordered and reviewed  Review and summarize past medical records: yes    Risk of Complications, Morbidity, and/or Mortality  Presenting problems: moderate  Diagnostic procedures: minimal  Management options: low  General comments: My differential includes but is not limited to neck pain, cervical contusion, degenerative disc disease, herniated disc, acute cervical strain, cervical radiculopathy, cervical fracture, torticollis, carotid artery dissection and vertebral artery dissection    Patient Progress  Patient progress: stable      Final diagnoses:   Muscle spasms of neck       ED Disposition  ED Disposition     ED Disposition   Discharge    Condition   Stable    Comment   --             Johnie Wood MD  [address]    [facility] [address] [phone]    Go to   As scheduled         Medication List      No changes were made to your prescriptions during this visit.         Labs Reviewed - No data to display  No orders to display          Medication List      No changes were made to your prescriptions during this visit.              Tim Armenta MD  08/17/22 0059       Tim Armenta MD  08/17/22 0059

## 2022-08-18 ENCOUNTER — TRANSCRIBE ORDERS (OUTPATIENT)
Dept: ADMINISTRATIVE | Facility: HOSPITAL | Age: 62
End: 2022-08-18

## 2022-08-18 DIAGNOSIS — R91.1 LUNG NODULE: Primary | ICD-10-CM

## 2023-01-28 ENCOUNTER — HOSPITAL ENCOUNTER (EMERGENCY)
Facility: HOSPITAL | Age: 63
Discharge: HOME | End: 2023-01-28
Attending: EMERGENCY MEDICINE | Admitting: EMERGENCY MEDICINE

## 2023-01-28 VITALS
HEIGHT: 66 IN | BODY MASS INDEX: 30.37 KG/M2 | SYSTOLIC BLOOD PRESSURE: 115 MMHG | OXYGEN SATURATION: 97 % | RESPIRATION RATE: 20 BRPM | WEIGHT: 189 LBS | TEMPERATURE: 96.8 F | DIASTOLIC BLOOD PRESSURE: 87 MMHG | HEART RATE: 68 BPM

## 2023-01-28 DIAGNOSIS — Z91.018 FOOD ALLERGY: Primary | ICD-10-CM

## 2023-01-28 PROCEDURE — 25010000002 TRIAMCINOLONE PER 10 MG: Performed by: EMERGENCY MEDICINE

## 2023-01-28 PROCEDURE — 99282 EMERGENCY DEPT VISIT SF MDM: CPT

## 2023-01-28 PROCEDURE — 96372 THER/PROPH/DIAG INJ SC/IM: CPT

## 2023-01-28 RX ORDER — TRIAMCINOLONE ACETONIDE 40 MG/ML
60 INJECTION, SUSPENSION INTRA-ARTICULAR; INTRAMUSCULAR ONCE
Status: COMPLETED | OUTPATIENT
Start: 2023-01-28 | End: 2023-01-28

## 2023-01-28 RX ORDER — POTASSIUM CHLORIDE 750 MG/1
10 CAPSULE, EXTENDED RELEASE ORAL
COMMUNITY

## 2023-01-28 RX ORDER — FUROSEMIDE 20 MG/1
20 TABLET ORAL
COMMUNITY

## 2023-01-28 RX ORDER — DIPHENHYDRAMINE HCL 25 MG
25 TABLET ORAL EVERY 6 HOURS PRN
Qty: 24 TABLET | Refills: 0 | Status: SHIPPED | OUTPATIENT
Start: 2023-01-28

## 2023-01-28 RX ADMIN — TRIAMCINOLONE ACETONIDE 60 MG: 40 INJECTION, SUSPENSION INTRA-ARTICULAR; INTRAMUSCULAR at 08:53

## 2023-01-29 ENCOUNTER — HOSPITAL ENCOUNTER (EMERGENCY)
Facility: HOSPITAL | Age: 63
Discharge: HOME | End: 2023-01-29
Attending: EMERGENCY MEDICINE | Admitting: EMERGENCY MEDICINE

## 2023-01-29 VITALS
RESPIRATION RATE: 16 BRPM | DIASTOLIC BLOOD PRESSURE: 87 MMHG | OXYGEN SATURATION: 100 % | HEART RATE: 66 BPM | TEMPERATURE: 97.8 F | SYSTOLIC BLOOD PRESSURE: 137 MMHG

## 2023-01-29 DIAGNOSIS — T78.1XXA ALLERGIC REACTION TO FOOD, INITIAL ENCOUNTER: Primary | ICD-10-CM

## 2023-01-29 PROCEDURE — 25010000002 METHYLPREDNISOLONE PER 125 MG: Performed by: EMERGENCY MEDICINE

## 2023-01-29 PROCEDURE — 96375 TX/PRO/DX INJ NEW DRUG ADDON: CPT

## 2023-01-29 PROCEDURE — 96374 THER/PROPH/DIAG INJ IV PUSH: CPT

## 2023-01-29 PROCEDURE — 99282 EMERGENCY DEPT VISIT SF MDM: CPT

## 2023-01-29 RX ORDER — SODIUM CHLORIDE 0.9 % (FLUSH) 0.9 %
10 SYRINGE (ML) INJECTION AS NEEDED
Status: DISCONTINUED | OUTPATIENT
Start: 2023-01-29 | End: 2023-01-29 | Stop reason: HOSPADM

## 2023-01-29 RX ORDER — FAMOTIDINE 10 MG/ML
20 INJECTION, SOLUTION INTRAVENOUS ONCE
Status: COMPLETED | OUTPATIENT
Start: 2023-01-29 | End: 2023-01-29

## 2023-01-29 RX ORDER — METHYLPREDNISOLONE SODIUM SUCCINATE 125 MG/2ML
125 INJECTION, POWDER, LYOPHILIZED, FOR SOLUTION INTRAMUSCULAR; INTRAVENOUS ONCE
Status: COMPLETED | OUTPATIENT
Start: 2023-01-29 | End: 2023-01-29

## 2023-01-29 RX ORDER — METHYLPREDNISOLONE 4 MG/1
TABLET ORAL
Qty: 21 TABLET | Refills: 0 | Status: SHIPPED | OUTPATIENT
Start: 2023-01-29

## 2023-01-29 RX ADMIN — FAMOTIDINE 20 MG: 10 INJECTION INTRAVENOUS at 04:36

## 2023-01-29 RX ADMIN — METHYLPREDNISOLONE SODIUM SUCCINATE 125 MG: 125 INJECTION, POWDER, FOR SOLUTION INTRAMUSCULAR; INTRAVENOUS at 04:36

## 2023-02-10 ENCOUNTER — HOSPITAL ENCOUNTER (EMERGENCY)
Facility: HOSPITAL | Age: 63
Discharge: HOME | End: 2023-02-10
Attending: EMERGENCY MEDICINE | Admitting: EMERGENCY MEDICINE

## 2023-02-10 VITALS
BODY MASS INDEX: 30.22 KG/M2 | WEIGHT: 188 LBS | SYSTOLIC BLOOD PRESSURE: 135 MMHG | HEART RATE: 60 BPM | OXYGEN SATURATION: 99 % | HEIGHT: 66 IN | TEMPERATURE: 97.4 F | DIASTOLIC BLOOD PRESSURE: 92 MMHG | RESPIRATION RATE: 15 BRPM

## 2023-02-10 DIAGNOSIS — T78.40XA ALLERGIC REACTION, INITIAL ENCOUNTER: Primary | ICD-10-CM

## 2023-02-10 PROCEDURE — 25010000002 TRIAMCINOLONE PER 10 MG: Performed by: EMERGENCY MEDICINE

## 2023-02-10 PROCEDURE — 99282 EMERGENCY DEPT VISIT SF MDM: CPT

## 2023-02-10 PROCEDURE — 96372 THER/PROPH/DIAG INJ SC/IM: CPT

## 2023-02-10 RX ORDER — TRIAMCINOLONE ACETONIDE 40 MG/ML
20 INJECTION, SUSPENSION INTRA-ARTICULAR; INTRAMUSCULAR ONCE
Status: COMPLETED | OUTPATIENT
Start: 2023-02-10 | End: 2023-02-10

## 2023-02-10 RX ADMIN — TRIAMCINOLONE ACETONIDE 20 MG: 40 INJECTION, SUSPENSION INTRA-ARTICULAR; INTRAMUSCULAR at 08:06

## 2023-02-10 NOTE — ED PROVIDER NOTES
Subjective   History of Present Illness  62-year-old female past medical history of hyperlipidemia thyroid disease and significant list of allergies presents to emergency room complaining of the fact that she thinks she is allergic to her Singulair that she started taking 2 days ago.  Patient states she started taking Singulair because of her allergies.  Patient brought the bottle with her and it was filled on November 30, 2022 however patient states she only started taking the medication 2 days ago.  Patient states when she woke up last night she felt that her face was tingling in her lips were tingling.  She took 50 mg of Benadryl and went back to sleep.  When she woke up this morning she states she thinks she was slightly better but decided to come the emergency room.  Patient denies headache visual changes chest pain shortness of breath coughing stridor difficulty swallowing or speaking nausea vomiting diarrhea or fever.        Review of Systems   Constitutional: Negative for activity change, appetite change, chills, diaphoresis, fatigue and fever.        Face tingling and lip tingling   HENT: Negative for congestion, rhinorrhea and sore throat.    Eyes: Negative for photophobia and visual disturbance.   Respiratory: Negative for cough and shortness of breath.    Cardiovascular: Negative for chest pain, palpitations and leg swelling.   Gastrointestinal: Negative for abdominal distention, abdominal pain, diarrhea, nausea and vomiting.   Genitourinary: Negative for dysuria and flank pain.   Musculoskeletal: Negative for arthralgias and back pain.   Skin: Negative for rash.   Neurological: Negative for dizziness, weakness and headaches.   Psychiatric/Behavioral: Negative for agitation and behavioral problems.       Past Medical History:   Diagnosis Date   • Bowel obstruction (HCC) 2016    h/o   • Colon polyp    • Disease of thyroid gland     hypothyroidism   • Heart murmur    • Hyperlipidemia    • PONV  (postoperative nausea and vomiting)     had after w/pain pill, not sure what type       Allergies   Allergen Reactions   • Antihistamines, Diphenhydramine-Type Hives   • Capsicum Annuum Extract & Derivative (Bell Pepper) [Capsicum] Hives   • Cheese Flavor Hives     mozerella   • Chicken Allergy Hives   • Claritin [Loratadine] Hives   • Cow's Milk [Lac Bovis] Hives   • Cvs Yogurt + Calcium [Ca Phosphate-Cholecalciferol] Hives   • Dairy Aid [Tilactase] Hives   • Eggs Or Egg-Derived Products Hives   • Food Color Orange [Yellow Dye] Hives   • Carmen Hives   • Gluten Meal Hives     Pt is gluten free   • Lambs Quarters Hives     Rash, hives   • Lima Beans Hives   • Mustard Seed Hives   • Other Hives     Pt states all Antihistamines cause hives   • Rye Hives   • Soybeans Hives   • Wheat Hives       Past Surgical History:   Procedure Laterality Date   • APPENDECTOMY     • CHEST WALL MASS EXCISION Right 10/17/2018    Procedure: EXCISION OF RIGHT CHEST WALL MASS;  Surgeon: Tim Palencia MD;  Location: [facility] OR;  Service: General   • COLON SURGERY  2016    states piece removed d/t obstruction/scar tissue   • COLONOSCOPY N/A 11/28/2016    Procedure: COLONOSCOPY with polypectomy;  Surgeon: Kristy Caballero MD;  Location: [facility] OR;  Service:    • OVARIAN CYST SURGERY      x2       Family History   Problem Relation Age of Onset   • Stroke Mother    • Hypertension Mother    • Alzheimer's disease Mother    • Prostate cancer Father    • Heart disease Father         pacemaker   • Colon polyps Sister    • Hypertension Brother    • Prostate cancer Brother    • Malig Hyperthermia Neg Hx    • Breast cancer Neg Hx        Social History     Socioeconomic History   • Marital status:    Tobacco Use   • Smoking status: Never   • Smokeless tobacco: Never   Vaping Use   • Vaping Use: Never used   Substance and Sexual Activity   • Alcohol use: Not Currently     Comment: rarely   • Drug use: No   • Sexual activity: Defer            Objective   Physical Exam  Vitals and nursing note reviewed.   Constitutional:       General: She is not in acute distress.     Appearance: Normal appearance. She is not ill-appearing, toxic-appearing or diaphoretic.   HENT:      Head: Normocephalic and atraumatic.      Nose: Nose normal.      Mouth/Throat:      Mouth: Mucous membranes are moist.   Eyes:      Conjunctiva/sclera: Conjunctivae normal.   Cardiovascular:      Rate and Rhythm: Normal rate and regular rhythm.      Pulses: Normal pulses.   Pulmonary:      Effort: Pulmonary effort is normal.      Breath sounds: Normal breath sounds.   Abdominal:      General: Abdomen is flat. There is no distension.      Tenderness: There is no abdominal tenderness.   Musculoskeletal:         General: No swelling. Normal range of motion.      Cervical back: Normal range of motion and neck supple.   Skin:     General: Skin is warm and dry.   Neurological:      General: No focal deficit present.      Mental Status: She is alert and oriented to person, place, and time.   Psychiatric:         Mood and Affect: Mood normal.         Procedures           ED Course  ED Course as of 02/10/23 0752   Fri Feb 10, 2023   0746 Patient's face and lips do not appear to have any type of rash or edema.  Oropharynx is within normal limits.  Patient gives vague symptoms of allergic reaction but does have a very strong concern that she might be having allergic reaction.  I will treat patient with some prednisone in the emergency room and have her discharge follow with primary care with Medrol Dosepak.  Patient states she understands agrees with plan. []   2660 Patient states patient chart states she is allergic to yellow dye thus unable to take Pepcid or steroids p.o.  Patient has had Kenalog IM in the past we will give her that in the emergency room. []      ED Course User Index  [] Toni Steele MD                                           Medical Decision  Making      Final diagnoses:   Allergic reaction, initial encounter       ED Disposition  ED Disposition     ED Disposition   Discharge    Condition   Stable    Comment   --             Johnie Wood MD  [address]    [facility] [address] [phone]    Schedule an appointment as soon as possible for a visit       [facility] Emergency Department  [address] [address] [phone]  Go to   As needed         Medication List      No changes were made to your prescriptions during this visit.          Toni Steele MD  02/10/23 0752

## 2023-02-20 ENCOUNTER — HOSPITAL ENCOUNTER (EMERGENCY)
Facility: HOSPITAL | Age: 63
Discharge: HOME | End: 2023-02-20
Attending: EMERGENCY MEDICINE | Admitting: EMERGENCY MEDICINE

## 2023-02-20 VITALS
TEMPERATURE: 98.2 F | SYSTOLIC BLOOD PRESSURE: 112 MMHG | HEART RATE: 74 BPM | WEIGHT: 181 LBS | HEIGHT: 67 IN | RESPIRATION RATE: 15 BRPM | DIASTOLIC BLOOD PRESSURE: 70 MMHG | BODY MASS INDEX: 28.41 KG/M2 | OXYGEN SATURATION: 100 %

## 2023-02-20 DIAGNOSIS — R00.2 PALPITATIONS: Primary | ICD-10-CM

## 2023-02-20 DIAGNOSIS — I49.3 PVCS (PREMATURE VENTRICULAR CONTRACTIONS): ICD-10-CM

## 2023-02-20 LAB
ALBUMIN SERPL-MCNC: 4.2 G/DL (ref 3.5–5.2)
ALBUMIN/GLOB SERPL: 1.2 G/DL
ALP SERPL-CCNC: 101 U/L (ref 39–117)
ALT SERPL W P-5'-P-CCNC: 17 U/L (ref 1–33)
ANION GAP SERPL CALCULATED.3IONS-SCNC: 12.8 MMOL/L (ref 5–15)
AST SERPL-CCNC: 20 U/L (ref 1–32)
BASOPHILS # BLD AUTO: 0.04 10*3/MM3 (ref 0–0.2)
BASOPHILS NFR BLD AUTO: 0.6 % (ref 0–1.5)
BILIRUB SERPL-MCNC: 0.3 MG/DL (ref 0–1.2)
BUN SERPL-MCNC: 11 MG/DL (ref 8–23)
BUN/CREAT SERPL: 9.8 (ref 7–25)
CALCIUM SPEC-SCNC: 9.5 MG/DL (ref 8.6–10.5)
CHLORIDE SERPL-SCNC: 98 MMOL/L (ref 98–107)
CO2 SERPL-SCNC: 25.2 MMOL/L (ref 22–29)
CREAT SERPL-MCNC: 1.12 MG/DL (ref 0.57–1)
DEPRECATED RDW RBC AUTO: 50 FL (ref 37–54)
EGFRCR SERPLBLD CKD-EPI 2021: 55 ML/MIN/1.73
EOSINOPHIL # BLD AUTO: 0.09 10*3/MM3 (ref 0–0.4)
EOSINOPHIL NFR BLD AUTO: 1.4 % (ref 0.3–6.2)
ERYTHROCYTE [DISTWIDTH] IN BLOOD BY AUTOMATED COUNT: 15.9 % (ref 12.3–15.4)
GLOBULIN UR ELPH-MCNC: 3.5 GM/DL
GLUCOSE SERPL-MCNC: 143 MG/DL (ref 65–99)
HCT VFR BLD AUTO: 43.7 % (ref 34–46.6)
HGB BLD-MCNC: 14 G/DL (ref 12–15.9)
HOLD SPECIMEN: NORMAL
HOLD SPECIMEN: NORMAL
IMM GRANULOCYTES # BLD AUTO: 0.01 10*3/MM3 (ref 0–0.05)
IMM GRANULOCYTES NFR BLD AUTO: 0.2 % (ref 0–0.5)
LYMPHOCYTES # BLD AUTO: 2.77 10*3/MM3 (ref 0.7–3.1)
LYMPHOCYTES NFR BLD AUTO: 44.2 % (ref 19.6–45.3)
MAGNESIUM SERPL-MCNC: 2 MG/DL (ref 1.6–2.4)
MCH RBC QN AUTO: 27.6 PG (ref 26.6–33)
MCHC RBC AUTO-ENTMCNC: 32 G/DL (ref 31.5–35.7)
MCV RBC AUTO: 86 FL (ref 79–97)
MONOCYTES # BLD AUTO: 0.61 10*3/MM3 (ref 0.1–0.9)
MONOCYTES NFR BLD AUTO: 9.7 % (ref 5–12)
NEUTROPHILS NFR BLD AUTO: 2.75 10*3/MM3 (ref 1.7–7)
NEUTROPHILS NFR BLD AUTO: 43.9 % (ref 42.7–76)
NRBC BLD AUTO-RTO: 0 /100 WBC (ref 0–0.2)
PLATELET # BLD AUTO: 240 10*3/MM3 (ref 140–450)
PMV BLD AUTO: 10.3 FL (ref 6–12)
POTASSIUM SERPL-SCNC: 3.3 MMOL/L (ref 3.5–5.2)
PROT SERPL-MCNC: 7.7 G/DL (ref 6–8.5)
QT INTERVAL: 330 MS
RBC # BLD AUTO: 5.08 10*6/MM3 (ref 3.77–5.28)
SODIUM SERPL-SCNC: 136 MMOL/L (ref 136–145)
T4 FREE SERPL-MCNC: 1.55 NG/DL (ref 0.93–1.7)
TROPONIN T SERPL HS-MCNC: <6 NG/L
TSH SERPL DL<=0.05 MIU/L-ACNC: 1.11 UIU/ML (ref 0.27–4.2)
WBC NRBC COR # BLD: 6.27 10*3/MM3 (ref 3.4–10.8)
WHOLE BLOOD HOLD COAG: NORMAL
WHOLE BLOOD HOLD SPECIMEN: NORMAL

## 2023-02-20 PROCEDURE — 84439 ASSAY OF FREE THYROXINE: CPT | Performed by: EMERGENCY MEDICINE

## 2023-02-20 PROCEDURE — 83735 ASSAY OF MAGNESIUM: CPT | Performed by: EMERGENCY MEDICINE

## 2023-02-20 PROCEDURE — 84443 ASSAY THYROID STIM HORMONE: CPT | Performed by: EMERGENCY MEDICINE

## 2023-02-20 PROCEDURE — 84484 ASSAY OF TROPONIN QUANT: CPT | Performed by: EMERGENCY MEDICINE

## 2023-02-20 PROCEDURE — 93010 ELECTROCARDIOGRAM REPORT: CPT | Performed by: INTERNAL MEDICINE

## 2023-02-20 PROCEDURE — 85025 COMPLETE CBC W/AUTO DIFF WBC: CPT | Performed by: EMERGENCY MEDICINE

## 2023-02-20 PROCEDURE — 99283 EMERGENCY DEPT VISIT LOW MDM: CPT

## 2023-02-20 PROCEDURE — 93005 ELECTROCARDIOGRAM TRACING: CPT | Performed by: EMERGENCY MEDICINE

## 2023-02-20 PROCEDURE — 80053 COMPREHEN METABOLIC PANEL: CPT | Performed by: EMERGENCY MEDICINE

## 2023-02-20 RX ORDER — SODIUM CHLORIDE 0.9 % (FLUSH) 0.9 %
10 SYRINGE (ML) INJECTION AS NEEDED
Status: DISCONTINUED | OUTPATIENT
Start: 2023-02-20 | End: 2023-02-20 | Stop reason: HOSPADM

## 2023-02-20 NOTE — ED PROVIDER NOTES
"Subjective     History provided by:  Patient    History of Present Illness    · Chief complaint: Heart beating irregular.  Heart was also beating fast.    · Location: Heart    · Quality/Severity: The patient states her heart would be regular and then pause and then the regular and then pause this morning.  This afternoon her heart was beating fast and regular.    · Timing/Onset: Started this morning.    · Modifying Factors: None known.  Patient is not on any decongestants.    · Associated symptoms: No chest pain or shortness of breath or diaphoresis.    · Narrative: The patient is a 62-year-old -American female who states this morning she noticed that her heart would be regular then pause and beat regular then pause for a about an hour before resolving when she was up doing her activities.  She states about an hour prior to arrival she noticed that her heart was beating irregularly and fast.  She did not actually count how fast it was going.  She has no history of coronary artery disease or arrhythmia.  She does have a history of a heart murmur.    Review of Systems  Past Medical History:   Diagnosis Date   • Bowel obstruction (HCC) 2016    h/o   • Colon polyp    • Disease of thyroid gland     hypothyroidism   • Heart murmur    • Hyperlipidemia    • PONV (postoperative nausea and vomiting)     had after w/pain pill, not sure what type     /70   Pulse 74   Temp 98.2 °F (36.8 °C) (Oral)   Resp 15   Ht 170.2 cm (67\")   Wt 82.1 kg (181 lb)   SpO2 100%   BMI 28.35 kg/m²     Past Medical History:   Diagnosis Date   • Bowel obstruction (HCC) 2016    h/o   • Colon polyp    • Disease of thyroid gland     hypothyroidism   • Heart murmur    • Hyperlipidemia    • PONV (postoperative nausea and vomiting)     had after w/pain pill, not sure what type       Allergies   Allergen Reactions   • Antihistamines, Diphenhydramine-Type Hives   • Capsicum Annuum Extract & Derivative (Bell Pepper) [Capsicum] Hives   • " Cheese Flavor Hives     mozerella   • Chicken Allergy Hives   • Claritin [Loratadine] Hives   • Cow's Milk [Lac Bovis] Hives   • Cvs Yogurt + Calcium [Ca Phosphate-Cholecalciferol] Hives   • Dairy Aid [Tilactase] Hives   • Eggs Or Egg-Derived Products Hives   • Food Color Orange [Yellow Dye] Hives   • Carmen Hives   • Gluten Meal Hives     Pt is gluten free   • Lambs Quarters Hives     Rash, hives   • Lima Beans Hives   • Mustard Seed Hives   • Other Hives     Pt states all Antihistamines cause hives   • Rye Hives   • Soybeans Hives   • Wheat Hives       Past Surgical History:   Procedure Laterality Date   • APPENDECTOMY     • CHEST WALL MASS EXCISION Right 10/17/2018    Procedure: EXCISION OF RIGHT CHEST WALL MASS;  Surgeon: Tim Palencia MD;  Location: [facility] OR;  Service: General   • COLON SURGERY  2016    states piece removed d/t obstruction/scar tissue   • COLONOSCOPY N/A 11/28/2016    Procedure: COLONOSCOPY with polypectomy;  Surgeon: Kristy Caballero MD;  Location: [facility] OR;  Service:    • OVARIAN CYST SURGERY      x2       Family History   Problem Relation Age of Onset   • Stroke Mother    • Hypertension Mother    • Alzheimer's disease Mother    • Prostate cancer Father    • Heart disease Father         pacemaker   • Colon polyps Sister    • Hypertension Brother    • Prostate cancer Brother    • Malig Hyperthermia Neg Hx    • Breast cancer Neg Hx        Social History     Socioeconomic History   • Marital status:    Tobacco Use   • Smoking status: Never   • Smokeless tobacco: Never   Vaping Use   • Vaping Use: Never used   Substance and Sexual Activity   • Alcohol use: Not Currently     Comment: rarely   • Drug use: No   • Sexual activity: Defer           Objective   Physical Exam  Vitals and nursing note reviewed.   Constitutional:       General: She is not in acute distress.     Appearance: Normal appearance. She is well-developed and normal weight. She is not ill-appearing, toxic-appearing or  diaphoretic.      Comments: The patient appears healthy in no acute distress.  Review of her vital signs: She is afebrile with a temperature of 98.2, respirations normal 15 with a normal room air oxygen saturation of 100%, heart rate slightly elevated 90, blood pressure slightly elevated 153/84.   HENT:      Head: Normocephalic and atraumatic.      Nose: Nose normal.      Mouth/Throat:      Mouth: Mucous membranes are moist.      Pharynx: Oropharynx is clear. No oropharyngeal exudate or posterior oropharyngeal erythema.   Eyes:      General: No scleral icterus.        Right eye: No discharge.         Left eye: No discharge.      Conjunctiva/sclera: Conjunctivae normal.      Pupils: Pupils are equal, round, and reactive to light.   Neck:      Thyroid: No thyromegaly.      Vascular: No carotid bruit or JVD.   Cardiovascular:      Rate and Rhythm: Normal rate and regular rhythm.      Heart sounds: Normal heart sounds. No murmur heard.  Pulmonary:      Effort: Pulmonary effort is normal.      Breath sounds: Normal breath sounds. No wheezing, rhonchi or rales.   Chest:      Chest wall: No tenderness.   Abdominal:      General: Bowel sounds are normal. There is no distension.      Palpations: Abdomen is soft.      Tenderness: There is no abdominal tenderness.   Musculoskeletal:         General: No tenderness or deformity. Normal range of motion.      Cervical back: Normal range of motion and neck supple. No tenderness.   Lymphadenopathy:      Cervical: No cervical adenopathy.   Skin:     General: Skin is warm and dry.      Capillary Refill: Capillary refill takes less than 2 seconds.      Coloration: Skin is not pale.      Findings: No erythema or rash.   Neurological:      General: No focal deficit present.      Mental Status: She is alert and oriented to person, place, and time.      Cranial Nerves: No cranial nerve deficit.      Coordination: Coordination normal.      Comments: No focal motor sensory deficit    Psychiatric:         Mood and Affect: Mood normal.         Behavior: Behavior normal.         Thought Content: Thought content normal.         Judgment: Judgment normal.         Procedures           ED Course  ED Course as of 02/20/23 1921 Mon Feb 20, 2023 1734 My interpretation of the patient's EKG tracing performed 17: 26 is normal sinus rhythm with a rate of 95, normal axis, normal conduction, no acute ST segment elevation or depression consistent with ischemia, no ectopy, normal R wave transition, normal MA and QT intervals.  Normal EKG.  No change in comparison to tracing 8/16/2022. [TP]   1746 Review the patient's laboratory studies: Her thyroid function test within normal limits.  Cardiac troponin negative.  CBC has a normal white count of 6.27 with a normal differential.  Hemoglobin, hematocrit and platelets within normal limits.  CMP has an elevated blood glucose of 143 with a normal sodium of 136 and a slightly low potassium of 3.3.  Her creatinine is slightly elevated at 1.12 with a normal BUN of 11 and GFR of 55 that is low consistent with baseline for the patient and consistent with chronic kidney disease. [TP]   1904 I noticed the patient having an occasional PVC on her telemetry monitor.  Is my impression that PVCs are likely the etiology of her heart beating irregularly and pausing that she described this morning.  She is in a normal rhythm here, but she may have had an arrhythmia earlier.  I encouraged her to count her pulse next time it happened and record the rate as well as whether it is regular or not.  She will be instructed to follow-up with her PCP the [place] [facility] as well as cardiology. [TP]      ED Course User Index  [TP] Tim Armenta MD                                           Medical Decision Making  My differential diagnosis includes, but is not limited to: Sinus tachycardia, benign palpitations, PACs, PVCs, atrial fibrillation, paroxysmal supraventricular tachycardia,  ventricular tachycardia, MAT    Palpitations: acute illness or injury  PVCs (premature ventricular contractions): acute illness or injury  Amount and/or Complexity of Data Reviewed  Labs: ordered. Decision-making details documented in ED Course.  ECG/medicine tests: ordered. Decision-making details documented in ED Course.      Risk  Prescription drug management.          Final diagnoses:   Palpitations   PVCs (premature ventricular contractions)       ED Disposition  ED Disposition     ED Disposition   Discharge    Condition   Stable    Comment   --             [facility]  [address] [place] [place] [id number] [phone]  Schedule an appointment as soon as possible for a visit   next available have them schedule you a Holter monitor.    Fransisca Zarate MD  [address] [address]  [phone]    Schedule an appointment as soon as possible for a visit   next available         Medication List      No changes were made to your prescriptions during this visit.         Labs Reviewed   COMPREHENSIVE METABOLIC PANEL - Abnormal; Notable for the following components:       Result Value    Glucose 143 (*)     Creatinine 1.12 (*)     Potassium 3.3 (*)     eGFR 55.0 (*)     All other components within normal limits    Narrative:     GFR Normal >60  Chronic Kidney Disease <60  Kidney Failure <15     CBC WITH AUTO DIFFERENTIAL - Abnormal; Notable for the following components:    RDW 15.9 (*)     All other components within normal limits   SINGLE HSTROPONIN T - Normal    Narrative:     High Sensitive Troponin T Reference Range:  <10.0 ng/L- Negative Female for AMI  <15.0 ng/L- Negative Male for AMI  >=10 - Abnormal Female indicating possible myocardial injury.  >=15 - Abnormal Male indicating possible myocardial injury.   Clinicians would have to utilize clinical acumen, EKG, Troponin, and serial changes to determine if it is an Acute Myocardial Infarction or myocardial injury due to an  underlying chronic condition.        TSH - Normal   T4, FREE - Normal    Narrative:     Results may be falsely increased if patient taking Biotin.     MAGNESIUM - Normal   RAINBOW DRAW    Narrative:     The following orders were created for panel order Nikolai Draw.  Procedure                               Abnormality         Status                     ---------                               -----------         ------                     Green Top (Gel)[[id number]]                                  Final result               Lavender Top[[id number]]                                     Final result               Gold Top - SST[[id number]]                                   Final result               Light Blue Top[[id number]]                                   Final result                 Please view results for these tests on the individual orders.   GREEN TOP   LAVENDER TOP   GOLD TOP - SST   LIGHT BLUE TOP   CBC AND DIFFERENTIAL    Narrative:     The following orders were created for panel order CBC & Differential.  Procedure                               Abnormality         Status                     ---------                               -----------         ------                     CBC Auto Differential[[id number]]        Abnormal            Final result                 Please view results for these tests on the individual orders.     No orders to display          Medication List      No changes were made to your prescriptions during this visit.              Tim Armenta MD  02/20/23 1921

## 2023-04-26 ENCOUNTER — HOSPITAL ENCOUNTER (EMERGENCY)
Facility: HOSPITAL | Age: 63
Discharge: HOME | End: 2023-04-26
Attending: EMERGENCY MEDICINE

## 2023-04-26 VITALS
OXYGEN SATURATION: 100 % | HEART RATE: 64 BPM | WEIGHT: 182 LBS | SYSTOLIC BLOOD PRESSURE: 137 MMHG | HEIGHT: 66 IN | BODY MASS INDEX: 29.25 KG/M2 | RESPIRATION RATE: 18 BRPM | DIASTOLIC BLOOD PRESSURE: 88 MMHG | TEMPERATURE: 97.8 F

## 2023-04-26 DIAGNOSIS — T78.3XXA ANGIOEDEMA, INITIAL ENCOUNTER: ICD-10-CM

## 2023-04-26 DIAGNOSIS — T78.40XA ALLERGIC REACTION, INITIAL ENCOUNTER: Primary | ICD-10-CM

## 2023-04-26 PROCEDURE — 96372 THER/PROPH/DIAG INJ SC/IM: CPT

## 2023-04-26 PROCEDURE — 99282 EMERGENCY DEPT VISIT SF MDM: CPT

## 2023-04-26 PROCEDURE — 25010000002 METHYLPREDNISOLONE PER 125 MG: Performed by: EMERGENCY MEDICINE

## 2023-04-26 RX ORDER — METHYLPREDNISOLONE SODIUM SUCCINATE 40 MG/ML
INJECTION, POWDER, LYOPHILIZED, FOR SOLUTION INTRAMUSCULAR; INTRAVENOUS
Status: DISCONTINUED
Start: 2023-04-26 | End: 2023-04-26 | Stop reason: HOSPADM

## 2023-04-26 RX ORDER — METHYLPREDNISOLONE 4 MG/1
TABLET ORAL
Qty: 21 TABLET | Refills: 0 | Status: SHIPPED | OUTPATIENT
Start: 2023-04-26

## 2023-04-26 RX ORDER — METHYLPREDNISOLONE SODIUM SUCCINATE 125 MG/2ML
80 INJECTION, POWDER, LYOPHILIZED, FOR SOLUTION INTRAMUSCULAR; INTRAVENOUS ONCE
Status: COMPLETED | OUTPATIENT
Start: 2023-04-26 | End: 2023-04-26

## 2023-04-26 RX ADMIN — METHYLPREDNISOLONE SODIUM SUCCINATE 80 MG: 125 INJECTION, POWDER, FOR SOLUTION INTRAMUSCULAR; INTRAVENOUS at 11:10

## 2023-04-26 NOTE — ED TRIAGE NOTES
Pt to ED via PV. Pt states guacamole last night that caused a allergic reaction per pt. Pt states facial swelling and hives. Denies SOB.     Benadryl @ 3566

## 2023-04-27 NOTE — ED PROVIDER NOTES
Subjective   History of Present Illness   Patient presents complaining of a allergic reaction.  Patient says last night she ate some guacamole and shortly after eating, she developed some hives on her legs, abdomen, chest, and arms.  Patient says she is also developed a little bit of periorbital eye edema and lip swelling.  Patient did take some Benadryl last night and again today and says that the symptoms are not worsening but they are not improving.  Patient denies any near syncope, chest pain, difficulty breathing, difficulty swallowing, nausea, vomiting, or diarrhea.  Nothing seems to make it better or worse.        Review of Systems   All other systems reviewed and are negative.      Past Medical History:   Diagnosis Date   • Bowel obstruction 2016    h/o   • Colon polyp    • Disease of thyroid gland     hypothyroidism   • Heart murmur    • Hyperlipidemia    • PONV (postoperative nausea and vomiting)     had after w/pain pill, not sure what type       Allergies   Allergen Reactions   • Antihistamines, Diphenhydramine-Type Hives   • Capsicum Annuum Extract & Derivative (Bell Pepper) [Capsicum] Hives   • Cheese Flavor Hives     mozerella   • Chicken Allergy Hives   • Claritin [Loratadine] Hives   • Cow's Milk [Lac Bovis] Hives   • Cvs Yogurt + Calcium [Ca Phosphate-Cholecalciferol] Hives   • Dairy Aid [Tilactase] Hives   • Eggs Or Egg-Derived Products Hives   • Food Color Orange [Yellow Dye] Hives   • Carmen Hives   • Gluten Meal Hives     Pt is gluten free   • Lambs Quarters Hives     Rash, hives   • Lima Beans Hives   • Mustard Seed Hives   • Other Hives     Pt states all Antihistamines cause hives   • Rye Hives   • Soybeans Hives   • Wheat Hives       Past Surgical History:   Procedure Laterality Date   • APPENDECTOMY     • CHEST WALL MASS EXCISION Right 10/17/2018    Procedure: EXCISION OF RIGHT CHEST WALL MASS;  Surgeon: Tim Palencia MD;  Location: [facility];  Service: General   • COLON SURGERY  2016     states piece removed d/t obstruction/scar tissue   • COLONOSCOPY N/A 11/28/2016    Procedure: COLONOSCOPY with polypectomy;  Surgeon: Kristy Caballero MD;  Location: [facility];  Service:    • OVARIAN CYST SURGERY      x2       Family History   Problem Relation Age of Onset   • Stroke Mother    • Hypertension Mother    • Alzheimer's disease Mother    • Prostate cancer Father    • Heart disease Father         pacemaker   • Colon polyps Sister    • Hypertension Brother    • Prostate cancer Brother    • Malig Hyperthermia Neg Hx    • Breast cancer Neg Hx        Social History     Socioeconomic History   • Marital status:    Tobacco Use   • Smoking status: Never   • Smokeless tobacco: Never   Vaping Use   • Vaping Use: Never used   Substance and Sexual Activity   • Alcohol use: Not Currently     Comment: rarely   • Drug use: No   • Sexual activity: Defer           Objective   Physical Exam  Vitals and nursing note reviewed.   HENT:      Head: Normocephalic.      Comments: Patient's lower lip is mildly swollen.  No swelling of the upper lip.  No swelling of the tongue or oropharynx     Right Ear: External ear normal.      Nose: Nose normal.      Mouth/Throat:      Mouth: Mucous membranes are moist.      Pharynx: Oropharynx is clear.   Eyes:      Extraocular Movements: Extraocular movements intact.      Conjunctiva/sclera: Conjunctivae normal.      Pupils: Pupils are equal, round, and reactive to light.      Comments: There is mild edema over over bilateral superior medial portions of the periorbital region.   Cardiovascular:      Rate and Rhythm: Normal rate and regular rhythm.      Heart sounds: Normal heart sounds.   Pulmonary:      Effort: Pulmonary effort is normal.      Breath sounds: Normal breath sounds. No stridor. No wheezing.   Abdominal:      General: Abdomen is flat.      Palpations: Abdomen is soft.   Musculoskeletal:         General: No swelling.      Cervical back: Neck supple.   Skin:     General:  Skin is warm and dry.      Capillary Refill: Capillary refill takes 2 to 3 seconds.      Findings: No erythema or rash.   Neurological:      Mental Status: She is alert and oriented to person, place, and time.   Psychiatric:         Mood and Affect: Mood normal.         Behavior: Behavior normal.         Procedures           ED Course                                           Medical Decision Making  ddx allergic reaction, food allergy, contact dermatitis, angioedema    Pt seen again prior to d/c.  No signs of anaphylaxis or angioedema of the oral airway or throat.  Since it has been greater than 12 hours I am not concerned that patient is going to worsen.  Patient will be sent out on a steroid Dosepak and advised to follow-up.  Vitals stable and pt. in NAD. Non-toxic. Comfortable. Ambulating without difficulty.  Tolerating po.  Relaxed breathing.  All questions personally answered at the bedside and all d/c instructions personally reviewed with pt.  Discussed the importance of close outpt. f/u and pt. understands this and agrees to do so.  Pt agrees to return to ED immediately for any new, persistent, or worsening symptoms.    EMR Dragon/Transcription disclaimer:  Much of this encounter note is an electronic transcription/translation of spoken language to printed text, aka voice recognition.  The electronic translation of spoken language may permit erroneous or at times nonsensical words or phrases to be inadvertently transcribed; although I have reviewed the note for such errors, some may still exist so please interpret based on surrounding text content.      Allergic reaction, initial encounter: complicated acute illness or injury  Angioedema, initial encounter: complicated acute illness or injury  Risk  Prescription drug management.          Final diagnoses:   Allergic reaction, initial encounter   Angioedema, initial encounter       ED Disposition  ED Disposition     ED Disposition   Discharge    Condition    Stable    Comment   --             Johnie Wood MD  [address]    [place] [id number] [phone]    In 2 days           Where to Get Your Medications      These medications were sent to [id number] - [place] - [address] - [phone]  - [phone] FX  [address] [place] [address]    Phone: [phone]   · methylPREDNISolone 4 MG dose pack        Medication List      No changes were made to your prescriptions during this visit.          Zeb Neves MD  04/27/23 0754

## 2023-09-12 ENCOUNTER — HOSPITAL ENCOUNTER (EMERGENCY)
Facility: HOSPITAL | Age: 63
Discharge: HOME | End: 2023-09-12
Attending: EMERGENCY MEDICINE

## 2023-09-12 VITALS
RESPIRATION RATE: 18 BRPM | OXYGEN SATURATION: 100 % | SYSTOLIC BLOOD PRESSURE: 163 MMHG | HEART RATE: 65 BPM | TEMPERATURE: 98.2 F | DIASTOLIC BLOOD PRESSURE: 96 MMHG

## 2023-09-12 DIAGNOSIS — T78.40XA ALLERGIC REACTION, INITIAL ENCOUNTER: Primary | ICD-10-CM

## 2023-09-12 PROCEDURE — 99282 EMERGENCY DEPT VISIT SF MDM: CPT

## 2023-09-12 PROCEDURE — 25010000002 METHYLPREDNISOLONE PER 40 MG: Performed by: EMERGENCY MEDICINE

## 2023-09-12 PROCEDURE — 96372 THER/PROPH/DIAG INJ SC/IM: CPT

## 2023-09-12 RX ORDER — METHYLPREDNISOLONE SODIUM SUCCINATE 40 MG/ML
80 INJECTION, POWDER, LYOPHILIZED, FOR SOLUTION INTRAMUSCULAR; INTRAVENOUS ONCE
Status: COMPLETED | OUTPATIENT
Start: 2023-09-12 | End: 2023-09-12

## 2023-09-12 RX ORDER — METHYLPREDNISOLONE 4 MG/1
TABLET ORAL
Qty: 21 TABLET | Refills: 0 | Status: SHIPPED | OUTPATIENT
Start: 2023-09-12

## 2023-09-12 RX ADMIN — METHYLPREDNISOLONE SODIUM SUCCINATE 80 MG: 40 INJECTION, POWDER, FOR SOLUTION INTRAMUSCULAR; INTRAVENOUS at 06:26

## 2023-09-12 NOTE — ED PROVIDER NOTES
Subjective   History of Present Illness  Opal Ardon is a 62-year-old black female who presents secondary to concerns over possible allergic reaction.  Patient thinks she is having allergic reaction to a facial soap.  Patient washed her face approximately 7 PM last night.  She awakened this morning with swelling right lateral face.  Patient states she also had some hives on her legs.  Patient took Benadryl prior to coming to the ED.  Ms. Ardon is a history of numerous allergies.  No shortness of breath.  No difficulty swallowing.  Patient presents for evaluation.    History provided by:  Patient    Review of Systems   Constitutional: Negative.  Negative for fever.   HENT: Negative.  Negative for rhinorrhea.    Eyes: Negative.  Negative for redness.   Respiratory:  Negative for cough.    Cardiovascular:  Negative for chest pain.   Gastrointestinal:  Negative for abdominal pain.   Endocrine: Negative.    Genitourinary: Negative.  Negative for difficulty urinating.   Musculoskeletal: Negative.  Negative for back pain.   Skin: Negative.  Negative for color change.        Slight swelling lateral aspect right face.  Hives left ankle   Neurological: Negative.  Negative for syncope.   Hematological: Negative.    Psychiatric/Behavioral: Negative.     All other systems reviewed and are negative.    Past Medical History:   Diagnosis Date    Bowel obstruction 2016    h/o    Colon polyp     Disease of thyroid gland     hypothyroidism    Heart murmur     Hyperlipidemia     PONV (postoperative nausea and vomiting)     had after w/pain pill, not sure what type       Allergies   Allergen Reactions    Antihistamines, Diphenhydramine-Type Hives    Capsicum Annuum Extract & Derivative (Bell Pepper) [Capsicum] Hives    Cheese Flavor Hives     mozerella    Chicken Allergy Hives    Claritin [Loratadine] Hives    Cow's Milk [Milk (Cow)] Hives    Cvs Yogurt + Calcium [Ca Phosphate-Cholecalciferol] Hives    Dairy Aid [Tilactase] Hives     Eggs Or Egg-Derived Products Hives    Food Color Orange [Yellow Dye] Hives    Carmen Hives    Gluten Meal Hives     Pt is gluten free    Lambs Quarters Hives     Rash, hives    Lima Beans Hives    Mustard Seed Hives    Other Hives     Pt states all Antihistamines cause hives    Rye Hives    Soybeans Hives    Wheat Hives       Past Surgical History:   Procedure Laterality Date    APPENDECTOMY      CHEST WALL MASS EXCISION Right 10/17/2018    Procedure: EXCISION OF RIGHT CHEST WALL MASS;  Surgeon: Tim Palencia MD;  Location: [facility] OR;  Service: General    COLON SURGERY  2016    states piece removed d/t obstruction/scar tissue    COLONOSCOPY N/A 11/28/2016    Procedure: COLONOSCOPY with polypectomy;  Surgeon: Kristy Caballero MD;  Location: [facility] OR;  Service:     OVARIAN CYST SURGERY      x2       Family History   Problem Relation Age of Onset    Stroke Mother     Hypertension Mother     Alzheimer's disease Mother     Prostate cancer Father     Heart disease Father         pacemaker    Colon polyps Sister     Hypertension Brother     Prostate cancer Brother     Malig Hyperthermia Neg Hx     Breast cancer Neg Hx        Social History     Socioeconomic History    Marital status:    Tobacco Use    Smoking status: Never    Smokeless tobacco: Never   Vaping Use    Vaping Use: Never used   Substance and Sexual Activity    Alcohol use: Not Currently     Comment: rarely    Drug use: No    Sexual activity: Defer           Objective   Physical Exam  Vitals and nursing note reviewed.   Constitutional:       General: She is not in acute distress.     Appearance: Normal appearance. She is well-developed. She is not ill-appearing, toxic-appearing or diaphoretic.      Comments: 62-year-old black female sitting in bed.  Patient appears in good overall health.  Vital signs unremarkable except for /96.  Patient friendly and cooperative.  Speaking full sentences without any difficulty.  Handling her own secretions.    HENT:      Head: Normocephalic and atraumatic.      Jaw: There is normal jaw occlusion.        Right Ear: Tympanic membrane, ear canal and external ear normal.      Left Ear: Tympanic membrane, ear canal and external ear normal.      Nose: Nose normal.      Mouth/Throat:      Mouth: Mucous membranes are moist.      Pharynx: Oropharynx is clear.   Eyes:      Extraocular Movements: Extraocular movements intact.      Conjunctiva/sclera: Conjunctivae normal.      Pupils: Pupils are equal, round, and reactive to light.   Cardiovascular:      Rate and Rhythm: Normal rate and regular rhythm.      Pulses: Normal pulses.      Heart sounds: Normal heart sounds. No murmur heard.    No friction rub. No gallop.   Pulmonary:      Effort: Pulmonary effort is normal.      Breath sounds: Normal breath sounds.   Abdominal:      General: Bowel sounds are normal. There is no distension.      Palpations: Abdomen is soft. There is no mass.      Tenderness: There is no abdominal tenderness. There is no guarding or rebound.      Hernia: No hernia is present.   Musculoskeletal:         General: Normal range of motion.      Cervical back: Normal range of motion and neck supple.   Skin:     General: Skin is warm and dry.      Capillary Refill: Capillary refill takes less than 2 seconds.   Neurological:      General: No focal deficit present.      Mental Status: She is alert and oriented to person, place, and time.      Deep Tendon Reflexes: Reflexes are normal and symmetric.   Psychiatric:         Mood and Affect: Mood normal.         Behavior: Behavior normal.       Procedures           ED Course  ED Course as of 09/12/23 0714   Tue Sep 12, 2023   0618 Patient has slight swelling right face at the angle of mandible extending slightly onto the right lateral neck.  Giving IM Solu-Medrol.  Patient states she cannot take Pepcid.  Will prescribe steroids for home. [SS]      ED Course User Index  [SS] Prakash Beck MD      My differential  diagnosis for rash includes but is not limited to allergic reaction, hives, urticaria, anaphylactoid reaction, anaphylaxis, erythema multiforme, erythema nodosum, erythema infectiosum (Fifths disease), drug rash, contact dermatitis, soft tissue infection, cellulitis, abscess, impetigo, eczema, psoriasis, hidradenitis superlative, meningococcemia, sepsis, toxic shock syndrome, Frost spotted fever, Lyme disease, disseminated gonococcemia, syphilis, scarlet fever, scarlatina, chickenpox, herpes zoster, viral exanthem, pityriasis rosea, scabies, bedbugs and allergic reaction.                                         Medical Decision Making  Risk  Prescription drug management.        Final diagnoses:   Allergic reaction, initial encounter       ED Disposition  ED Disposition       ED Disposition   Discharge    Condition   Stable    Comment   --               [facility] - [address] [address] [phone]  Schedule an appointment as soon as possible for a visit in 1 week  for continued or worsened symptoms, Sooner if needed         Where to Get Your Medications        These medications were sent to [facility] [place], [place] - [address] [facility] [phone]  - [phone]   [address] [facility] [address]      Phone: [phone]   methylPREDNISolone 4 MG dose pack          Medication List      No changes were made to your prescriptions during this visit.            Prakash Beck MD  09/12/23 0714

## 2023-09-12 NOTE — DISCHARGE INSTRUCTIONS
Medication as directed.  Take Benadryl 4 times daily as needed for allergic reaction.  Follow-up with allergist as above.  Return to ED for medical emergencies

## 2023-11-10 ENCOUNTER — HOSPITAL ENCOUNTER (EMERGENCY)
Facility: HOSPITAL | Age: 63
Discharge: HOME | End: 2023-11-10
Attending: EMERGENCY MEDICINE
Payer: MEDICARE

## 2023-11-10 VITALS
HEART RATE: 54 BPM | BODY MASS INDEX: 27.78 KG/M2 | WEIGHT: 177 LBS | OXYGEN SATURATION: 100 % | RESPIRATION RATE: 18 BRPM | HEIGHT: 67 IN | SYSTOLIC BLOOD PRESSURE: 139 MMHG | DIASTOLIC BLOOD PRESSURE: 102 MMHG | TEMPERATURE: 97.6 F

## 2023-11-10 DIAGNOSIS — T78.40XA ALLERGIC REACTION, INITIAL ENCOUNTER: Primary | ICD-10-CM

## 2023-11-10 PROCEDURE — 25010000002 METHYLPREDNISOLONE PER 40 MG: Performed by: EMERGENCY MEDICINE

## 2023-11-10 PROCEDURE — 96372 THER/PROPH/DIAG INJ SC/IM: CPT

## 2023-11-10 PROCEDURE — 99282 EMERGENCY DEPT VISIT SF MDM: CPT

## 2023-11-10 RX ORDER — METHYLPREDNISOLONE SODIUM SUCCINATE 40 MG/ML
80 INJECTION, POWDER, LYOPHILIZED, FOR SOLUTION INTRAMUSCULAR; INTRAVENOUS ONCE
Status: COMPLETED | OUTPATIENT
Start: 2023-11-10 | End: 2023-11-10

## 2023-11-10 RX ORDER — PREDNISONE 10 MG/1
TABLET ORAL
Qty: 21 TABLET | Refills: 0 | Status: SHIPPED | OUTPATIENT
Start: 2023-11-10

## 2023-11-10 RX ADMIN — METHYLPREDNISOLONE SODIUM SUCCINATE 80 MG: 40 INJECTION, POWDER, FOR SOLUTION INTRAMUSCULAR; INTRAVENOUS at 00:56

## 2023-11-10 NOTE — DISCHARGE INSTRUCTIONS
Medication as directed.  Avoid allergen if known.  Follow-up with your PCP as needed.  Follow-up with allergist as above.  Return to ED for worsening symptoms, medical emergencies.

## 2023-11-10 NOTE — ED PROVIDER NOTES
"Subjective   History of Present Illness  Opal Ardon is a 63-year-old black female who presents secondary to allergic reaction.  Patient states \"I ate something I should not have\".  [facility]  She ate chips and queso as well as broccoli.  Patient developed facial swelling, hives, sensation of something crawling in her left ear and sensation of throat swelling approximately 8 PM.  Patient took 50 mg of Benadryl.  Symptoms have improved but are still present.  Thus patient elected to come to the ED for evaluation.    Patient has a lengthy list of allergies.    History provided by:  Patient      Review of Systems   Constitutional: Negative.  Negative for fever.   HENT:  Positive for facial swelling (As in HPI). Negative for rhinorrhea.    Eyes: Negative.  Negative for redness.   Respiratory:  Negative for cough, choking, shortness of breath, wheezing and stridor.    Cardiovascular:  Negative for chest pain.   Gastrointestinal:  Negative for abdominal pain.   Endocrine: Negative.    Genitourinary: Negative.  Negative for difficulty urinating.   Musculoskeletal: Negative.  Negative for back pain.   Skin:  Positive for rash (Hives as in HPI). Negative for color change.   Neurological: Negative.  Negative for syncope.   Hematological: Negative.    Psychiatric/Behavioral: Negative.     All other systems reviewed and are negative.      Past Medical History:   Diagnosis Date    Bowel obstruction 2016    h/o    Colon polyp     Disease of thyroid gland     hypothyroidism    Heart murmur     Hyperlipidemia     PONV (postoperative nausea and vomiting)     had after w/pain pill, not sure what type       Allergies   Allergen Reactions    Antihistamines, Diphenhydramine-Type Hives    Capsicum Annuum Extract & Derivative (Bell Pepper) [Capsicum] Hives    Cheese Flavor Hives     mozerella    Chicken Allergy Hives    Claritin [Loratadine] Hives    Cow's Milk [Milk (Cow)] Hives    Cvs Yogurt + Calcium " [Ca Phosphate-Cholecalciferol] Hives    Dairy Aid [Tilactase] Hives    Eggs Or Egg-Derived Products Hives    Food Color Orange [Yellow Dye] Hives    Carmen Hives    Gluten Meal Hives     Pt is gluten free    Lambs Quarters Hives     Rash, hives    Lima Beans Hives    Mustard Seed Hives    Other Hives     Pt states all Antihistamines cause hives    Rye Hives    Soybeans Hives    Wheat Hives       Past Surgical History:   Procedure Laterality Date    APPENDECTOMY      CHEST WALL MASS EXCISION Right 10/17/2018    Procedure: EXCISION OF RIGHT CHEST WALL MASS;  Surgeon: Tim Palencia MD;  Location: [facility];  Service: General    COLON SURGERY  2016    states piece removed d/t obstruction/scar tissue    COLONOSCOPY N/A 11/28/2016    Procedure: COLONOSCOPY with polypectomy;  Surgeon: Kristy Caballero MD;  Location: [facility] OR;  Service:     OVARIAN CYST SURGERY      x2       Family History   Problem Relation Age of Onset    Stroke Mother     Hypertension Mother     Alzheimer's disease Mother     Prostate cancer Father     Heart disease Father         pacemaker    Colon polyps Sister     Hypertension Brother     Prostate cancer Brother     Malig Hyperthermia Neg Hx     Breast cancer Neg Hx        Social History     Socioeconomic History    Marital status:    Tobacco Use    Smoking status: Never    Smokeless tobacco: Never   Vaping Use    Vaping Use: Never used   Substance and Sexual Activity    Alcohol use: Not Currently     Comment: rarely    Drug use: No    Sexual activity: Defer           Objective   Physical Exam  Vitals and nursing note reviewed.   Constitutional:       General: She is not in acute distress.     Appearance: Normal appearance. She is well-developed. She is not ill-appearing, toxic-appearing or diaphoretic.      Comments: 63-year-old black female sitting in bed.  Patient appears in good overall health.  Vital signs notable for BP of 179/100.  Otherwise unremarkable.  Patient friendly and  cooperative.   HENT:      Head: Normocephalic and atraumatic.      Comments: Minimal facial swelling.     Right Ear: Tympanic membrane, ear canal and external ear normal.      Left Ear: Tympanic membrane, ear canal and external ear normal.      Nose: Nose normal.      Mouth/Throat:      Mouth: Mucous membranes are moist.      Pharynx: Oropharynx is clear.   Eyes:      Extraocular Movements: Extraocular movements intact.      Conjunctiva/sclera: Conjunctivae normal.      Pupils: Pupils are equal, round, and reactive to light.   Cardiovascular:      Rate and Rhythm: Normal rate and regular rhythm.      Pulses: Normal pulses.      Heart sounds: Normal heart sounds. No murmur heard.     No friction rub. No gallop.   Pulmonary:      Effort: Pulmonary effort is normal. No respiratory distress.      Breath sounds: Normal breath sounds. No stridor. No wheezing, rhonchi or rales.   Abdominal:      General: Bowel sounds are normal. There is no distension.      Palpations: Abdomen is soft. There is no mass.      Tenderness: There is no abdominal tenderness. There is no guarding or rebound.      Hernia: No hernia is present.   Musculoskeletal:         General: Normal range of motion.      Cervical back: Normal range of motion and neck supple.   Skin:     General: Skin is warm and dry.      Capillary Refill: Capillary refill takes less than 2 seconds.   Neurological:      General: No focal deficit present.      Mental Status: She is alert and oriented to person, place, and time.      Deep Tendon Reflexes: Reflexes are normal and symmetric.   Psychiatric:         Mood and Affect: Mood normal.         Behavior: Behavior normal.         Procedures           ED Course  ED Course as of 11/10/23 0133   Fri Nov 10, 2023   0050 Giving Solu-Medrol 80 mg IM.  Airways patent.  Lungs are clear to auscultation.  Room air saturations 98%.  No evidence of respiratory compromise. [SS]   0130 Patient feeling better.  Lungs again clear to  auscultation.  Posterior pharynx unremarkable.  BP improved.  Will prescribe steroid Dosepak for home.  Will DC home.    Prescription  1-prednisone Dosepak [SS]      ED Course User Index  [SS] Prakash Beck MD           My differential diagnosis for rash includes but is not limited to allergic reaction, hives, urticaria, anaphylactoid reaction, anaphylaxis, erythema multiforme, erythema nodosum, erythema infectiosum (Fifths disease), drug rash, contact dermatitis, soft tissue infection, cellulitis, abscess, impetigo, eczema, psoriasis, hidradenitis superlative, meningococcemia, sepsis, toxic shock syndrome, Maljamar spotted fever, Lyme disease, disseminated gonococcemia, syphilis, scarlet fever, scarlatina, chickenpox, herpes zoster, viral exanthem, pityriasis rosea, scabies, bedbugs and allergic reaction.      My differential diagnosis for dyspnea includes but is not limited to:  Asthma, COPD, COVID-19, pneumonia, pulmonary embolus, acute respiratory distress syndrome, RSV, pneumothorax, pleural effusion, pulmonary fibrosis, congestive heart failure, myocardial infarction, DKA, uremia, acidosis, sepsis, anemia, drug related, hyperventilation, CNS disease                                  Medical Decision Making  Risk  Prescription drug management.        Final diagnoses:   Allergic reaction, initial encounter       ED Disposition  ED Disposition       ED Disposition   Discharge    Condition   Stable    Comment   --               [facility] - [address] [address] [phone]  Schedule an appointment as soon as possible for a visit in 1 week  for continued or worsened symptoms, Sooner if needed         Medication List        New Prescriptions      predniSONE 10 MG tablet  Commonly known as: DELTASONE  6 tabs by mouth day 1, 5 tabs by mouth day 2, continue tapering one tablet daily: Coarse 6 days.               Where to Get Your Medications        These  medications were sent to [facility] - [place] - [address] - [phone]  - [phone] FX  [address], [place] [address]      Phone: [phone]   predniSONE 10 MG tablet            Prakash Beck MD  11/10/23 7678

## (undated) DEVICE — TP SILK DURAPORE 2 IN

## (undated) DEVICE — LAG MINOR PROCEDURE: Brand: MEDLINE INDUSTRIES, INC.

## (undated) DEVICE — SUCTION CANISTER, 1000CC,SAFELINER: Brand: DEROYAL

## (undated) DEVICE — SUT MNCRYL 4/0 PS2 18 IN

## (undated) DEVICE — TOWEL,OR,DSP,ST,BLUE,STD,4/PK,20PK/CS: Brand: MEDLINE

## (undated) DEVICE — GLV SURG EUDERMIC PF LTX 8 STRL

## (undated) DEVICE — GAUZE,SPONGE,FLUFF,6"X6.75",STRL,5/TRAY: Brand: MEDLINE

## (undated) DEVICE — APPL CHLORAPREP W/TINT 26ML ORNG

## (undated) DEVICE — SUT VIC 3/0 CTI 36IN J944H